# Patient Record
Sex: MALE | Race: WHITE | NOT HISPANIC OR LATINO | Employment: UNEMPLOYED | URBAN - METROPOLITAN AREA
[De-identification: names, ages, dates, MRNs, and addresses within clinical notes are randomized per-mention and may not be internally consistent; named-entity substitution may affect disease eponyms.]

---

## 2018-02-01 ENCOUNTER — OFFICE VISIT CONVERTED (OUTPATIENT)
Dept: SURGERY | Facility: CLINIC | Age: 23
End: 2018-02-01
Attending: PHYSICIAN ASSISTANT

## 2018-03-12 ENCOUNTER — CONVERSION ENCOUNTER (OUTPATIENT)
Dept: UROLOGY | Facility: CLINIC | Age: 23
End: 2018-03-12

## 2018-03-12 ENCOUNTER — OFFICE VISIT CONVERTED (OUTPATIENT)
Dept: UROLOGY | Facility: CLINIC | Age: 23
End: 2018-03-12
Attending: UROLOGY

## 2020-04-13 ENCOUNTER — OFFICE VISIT CONVERTED (OUTPATIENT)
Dept: FAMILY MEDICINE CLINIC | Facility: CLINIC | Age: 25
End: 2020-04-13
Attending: PHYSICIAN ASSISTANT

## 2020-04-17 ENCOUNTER — HOSPITAL ENCOUNTER (OUTPATIENT)
Dept: GENERAL RADIOLOGY | Facility: HOSPITAL | Age: 25
Discharge: HOME | End: 2020-04-17
Attending: PHYSICIAN ASSISTANT

## 2020-06-22 ENCOUNTER — OFFICE VISIT CONVERTED (OUTPATIENT)
Dept: FAMILY MEDICINE CLINIC | Facility: CLINIC | Age: 25
End: 2020-06-22
Attending: PHYSICIAN ASSISTANT

## 2020-06-23 ENCOUNTER — HOSPITAL ENCOUNTER (OUTPATIENT)
Dept: LAB | Facility: HOSPITAL | Age: 25
Discharge: HOME | End: 2020-06-23
Attending: PHYSICIAN ASSISTANT

## 2020-06-23 LAB
25(OH)D3 SERPL-MCNC: 18.1 NG/ML (ref 30–100)
ALBUMIN SERPL-MCNC: 4.8 G/DL (ref 3.5–5)
ALBUMIN/GLOB SERPL: 1.6 {RATIO} (ref 1.4–2.6)
ALP SERPL-CCNC: 100 U/L (ref 53–128)
ALT SERPL-CCNC: 17 U/L (ref 10–40)
AMYLASE SERPL-CCNC: 48 U/L (ref 30–110)
ANION GAP SERPL CALC-SCNC: 23 MMOL/L (ref 8–19)
APPEARANCE UR: CLEAR
AST SERPL-CCNC: 20 U/L (ref 15–50)
BASOPHILS # BLD AUTO: 0.03 10*3/UL (ref 0–0.2)
BASOPHILS NFR BLD AUTO: 0.5 % (ref 0–3)
BILIRUB SERPL-MCNC: 0.62 MG/DL (ref 0.2–1.3)
BILIRUB UR QL: NEGATIVE
BUN SERPL-MCNC: 10 MG/DL (ref 5–25)
BUN/CREAT SERPL: 9 {RATIO} (ref 6–20)
CALCIUM SERPL-MCNC: 9.6 MG/DL (ref 8.7–10.4)
CHLORIDE SERPL-SCNC: 102 MMOL/L (ref 99–111)
CHOLEST SERPL-MCNC: 165 MG/DL (ref 107–200)
CHOLEST/HDLC SERPL: 4.3 {RATIO} (ref 3–6)
COLOR UR: YELLOW
CONV ABS IMM GRAN: 0.01 10*3/UL (ref 0–0.2)
CONV CO2: 20 MMOL/L (ref 22–32)
CONV COLLECTION SOURCE (UA): NORMAL
CONV IMMATURE GRAN: 0.2 % (ref 0–1.8)
CONV TOTAL PROTEIN: 7.8 G/DL (ref 6.3–8.2)
CONV UROBILINOGEN IN URINE BY AUTOMATED TEST STRIP: 0.2 {EHRLICHU}/DL (ref 0.1–1)
CREAT UR-MCNC: 1.08 MG/DL (ref 0.7–1.2)
DEPRECATED RDW RBC AUTO: 39.8 FL (ref 35.1–43.9)
EOSINOPHIL # BLD AUTO: 0.12 10*3/UL (ref 0–0.7)
EOSINOPHIL # BLD AUTO: 2.1 % (ref 0–7)
ERYTHROCYTE [DISTWIDTH] IN BLOOD BY AUTOMATED COUNT: 12.2 % (ref 11.6–14.4)
GFR SERPLBLD BASED ON 1.73 SQ M-ARVRAT: >60 ML/MIN/{1.73_M2}
GLOBULIN UR ELPH-MCNC: 3 G/DL (ref 2–3.5)
GLUCOSE SERPL-MCNC: 94 MG/DL (ref 70–99)
GLUCOSE UR QL: NEGATIVE MG/DL
HCT VFR BLD AUTO: 44.1 % (ref 42–52)
HDLC SERPL-MCNC: 38 MG/DL (ref 40–60)
HGB BLD-MCNC: 14.2 G/DL (ref 14–18)
HGB UR QL STRIP: NEGATIVE
KETONES UR QL STRIP: NEGATIVE MG/DL
LDLC SERPL CALC-MCNC: 109 MG/DL (ref 70–100)
LEUKOCYTE ESTERASE UR QL STRIP: NEGATIVE
LIPASE SERPL-CCNC: 44 U/L (ref 5–51)
LYMPHOCYTES # BLD AUTO: 1.92 10*3/UL (ref 1–5)
LYMPHOCYTES NFR BLD AUTO: 33.3 % (ref 20–45)
MCH RBC QN AUTO: 28.9 PG (ref 27–31)
MCHC RBC AUTO-ENTMCNC: 32.2 G/DL (ref 33–37)
MCV RBC AUTO: 89.6 FL (ref 80–96)
MONOCYTES # BLD AUTO: 0.46 10*3/UL (ref 0.2–1.2)
MONOCYTES NFR BLD AUTO: 8 % (ref 3–10)
NEUTROPHILS # BLD AUTO: 3.23 10*3/UL (ref 2–8)
NEUTROPHILS NFR BLD AUTO: 55.9 % (ref 30–85)
NITRITE UR QL STRIP: NEGATIVE
NRBC CBCN: 0 % (ref 0–0.7)
OSMOLALITY SERPL CALC.SUM OF ELEC: 291 MOSM/KG (ref 273–304)
PH UR STRIP.AUTO: 5 [PH] (ref 5–8)
PLATELET # BLD AUTO: 210 10*3/UL (ref 130–400)
PMV BLD AUTO: 12.3 FL (ref 9.4–12.4)
POTASSIUM SERPL-SCNC: 3.9 MMOL/L (ref 3.5–5.3)
PROT UR QL: NEGATIVE MG/DL
RBC # BLD AUTO: 4.92 10*6/UL (ref 4.7–6.1)
SODIUM SERPL-SCNC: 141 MMOL/L (ref 135–147)
SP GR UR: 1.02 (ref 1–1.03)
T4 FREE SERPL-MCNC: 1.6 NG/DL (ref 0.9–1.8)
TRIGL SERPL-MCNC: 91 MG/DL (ref 40–150)
TSH SERPL-ACNC: 2.82 M[IU]/L (ref 0.27–4.2)
VLDLC SERPL-MCNC: 18 MG/DL (ref 5–37)
WBC # BLD AUTO: 5.77 10*3/UL (ref 4.8–10.8)

## 2020-06-25 ENCOUNTER — HOSPITAL ENCOUNTER (OUTPATIENT)
Dept: GENERAL RADIOLOGY | Facility: HOSPITAL | Age: 25
Discharge: HOME | End: 2020-06-25
Attending: PHYSICIAN ASSISTANT

## 2020-06-29 ENCOUNTER — HOSPITAL ENCOUNTER (OUTPATIENT)
Dept: GENERAL RADIOLOGY | Facility: HOSPITAL | Age: 25
Discharge: HOME | End: 2020-06-29
Attending: PHYSICIAN ASSISTANT

## 2020-11-06 ENCOUNTER — OFFICE VISIT CONVERTED (OUTPATIENT)
Dept: FAMILY MEDICINE CLINIC | Facility: CLINIC | Age: 25
End: 2020-11-06
Attending: PHYSICIAN ASSISTANT

## 2020-11-10 ENCOUNTER — HOSPITAL ENCOUNTER (OUTPATIENT)
Dept: LAB | Facility: HOSPITAL | Age: 25
Discharge: HOME | End: 2020-11-10
Attending: PHYSICIAN ASSISTANT

## 2020-11-11 LAB
BARLEY IGE QN: <0.1
BEEF IGE QN: <0.1 K[IU]/ML (ref 0–0.35)
CHICKEN DROP IGE QN: <0.1
COCOA IGE QN: <0.1 K[IU]/ML (ref 0–0.35)
CORN IGE QN: <0.1 K[IU]/ML (ref 0–0.35)
COW MILK IGE QN: 0.48 K[IU]/ML (ref 0–0.35)
EGG WHITE IGE QN: <0.1 K[IU]/ML (ref 0–0.35)
IGE BREWER'S YEAST: <0.1 K[IU]/ML (ref 0–0.35)
IGE SERPL-ACNC: 55 K[IU]/ML (ref 0–24)
IMMUNOCAP RESULT: ABNORMAL (ref 0–0)
LETTUCE IGE QN: <0.1 K[IU]/ML
MALT IGE QN: <0.1
OAT IGE QN: <0.1 K[IU]/ML (ref 0–0.35)
ORANGE IGE QN: <0.1
PEANUT IGE QN: <0.1 K[IU]/ML (ref 0–0.35)
PORK IGE: <0.1 K[IU]/ML (ref 0–0.35)
POTATO IGE QN: <0.1 K[IU]/ML (ref 0–0.35)
RYE IGE: <0.1
SOYBEAN IGE QN: <0.1 K[IU]/ML (ref 0–0.35)
TOMATO IGE QN: <0.1
WHEAT IGE QN: 0.13 K[IU]/ML (ref 0–0.35)

## 2020-11-16 ENCOUNTER — OFFICE VISIT CONVERTED (OUTPATIENT)
Dept: PODIATRY | Facility: CLINIC | Age: 25
End: 2020-11-16
Attending: PODIATRIST

## 2020-11-30 ENCOUNTER — OFFICE VISIT CONVERTED (OUTPATIENT)
Dept: PODIATRY | Facility: CLINIC | Age: 25
End: 2020-11-30
Attending: PODIATRIST

## 2020-12-07 ENCOUNTER — HOSPITAL ENCOUNTER (OUTPATIENT)
Dept: GENERAL RADIOLOGY | Facility: HOSPITAL | Age: 25
Discharge: HOME | End: 2020-12-07
Attending: PHYSICIAN ASSISTANT

## 2020-12-10 ENCOUNTER — HOSPITAL ENCOUNTER (OUTPATIENT)
Dept: GENERAL RADIOLOGY | Facility: HOSPITAL | Age: 25
Discharge: HOME | End: 2020-12-10
Attending: PHYSICIAN ASSISTANT

## 2021-03-08 ENCOUNTER — OFFICE VISIT CONVERTED (OUTPATIENT)
Dept: FAMILY MEDICINE CLINIC | Facility: CLINIC | Age: 26
End: 2021-03-08
Attending: PHYSICIAN ASSISTANT

## 2021-03-11 ENCOUNTER — HOSPITAL ENCOUNTER (OUTPATIENT)
Dept: LAB | Facility: HOSPITAL | Age: 26
Discharge: HOME | End: 2021-03-11
Attending: PHYSICIAN ASSISTANT

## 2021-03-11 LAB
ALBUMIN SERPL-MCNC: 4.7 G/DL (ref 3.5–5)
ALBUMIN/GLOB SERPL: 1.5 {RATIO} (ref 1.4–2.6)
ALP SERPL-CCNC: 115 U/L (ref 53–128)
ALT SERPL-CCNC: 33 U/L (ref 10–40)
AMYLASE SERPL-CCNC: 53 U/L (ref 30–110)
ANION GAP SERPL CALC-SCNC: 15 MMOL/L (ref 8–19)
AST SERPL-CCNC: 31 U/L (ref 15–50)
BASOPHILS # BLD AUTO: 0.03 10*3/UL (ref 0–0.2)
BASOPHILS NFR BLD AUTO: 0.6 % (ref 0–3)
BILIRUB SERPL-MCNC: 0.44 MG/DL (ref 0.2–1.3)
BUN SERPL-MCNC: 10 MG/DL (ref 5–25)
BUN/CREAT SERPL: 10 {RATIO} (ref 6–20)
CALCIUM SERPL-MCNC: 9.2 MG/DL (ref 8.7–10.4)
CHLORIDE SERPL-SCNC: 102 MMOL/L (ref 99–111)
CONV ABS IMM GRAN: 0.02 10*3/UL (ref 0–0.2)
CONV CO2: 24 MMOL/L (ref 22–32)
CONV IMMATURE GRAN: 0.4 % (ref 0–1.8)
CONV TOTAL PROTEIN: 7.8 G/DL (ref 6.3–8.2)
CREAT UR-MCNC: 0.97 MG/DL (ref 0.7–1.2)
DEPRECATED RDW RBC AUTO: 38.9 FL (ref 35.1–43.9)
EOSINOPHIL # BLD AUTO: 0.09 10*3/UL (ref 0–0.7)
EOSINOPHIL # BLD AUTO: 1.8 % (ref 0–7)
ERYTHROCYTE [DISTWIDTH] IN BLOOD BY AUTOMATED COUNT: 12.2 % (ref 11.6–14.4)
GFR SERPLBLD BASED ON 1.73 SQ M-ARVRAT: >60 ML/MIN/{1.73_M2}
GLOBULIN UR ELPH-MCNC: 3.1 G/DL (ref 2–3.5)
GLUCOSE SERPL-MCNC: 89 MG/DL (ref 70–99)
HCT VFR BLD AUTO: 42.9 % (ref 42–52)
HGB BLD-MCNC: 14.4 G/DL (ref 14–18)
LIPASE SERPL-CCNC: 50 U/L (ref 5–51)
LYMPHOCYTES # BLD AUTO: 1.71 10*3/UL (ref 1–5)
LYMPHOCYTES NFR BLD AUTO: 34 % (ref 20–45)
MCH RBC QN AUTO: 29.1 PG (ref 27–31)
MCHC RBC AUTO-ENTMCNC: 33.6 G/DL (ref 33–37)
MCV RBC AUTO: 86.8 FL (ref 80–96)
MONOCYTES # BLD AUTO: 0.37 10*3/UL (ref 0.2–1.2)
MONOCYTES NFR BLD AUTO: 7.4 % (ref 3–10)
NEUTROPHILS # BLD AUTO: 2.81 10*3/UL (ref 2–8)
NEUTROPHILS NFR BLD AUTO: 55.8 % (ref 30–85)
NRBC CBCN: 0 % (ref 0–0.7)
OSMOLALITY SERPL CALC.SUM OF ELEC: 283 MOSM/KG (ref 273–304)
PLATELET # BLD AUTO: 177 10*3/UL (ref 130–400)
PMV BLD AUTO: 12 FL (ref 9.4–12.4)
POTASSIUM SERPL-SCNC: 4.1 MMOL/L (ref 3.5–5.3)
RBC # BLD AUTO: 4.94 10*6/UL (ref 4.7–6.1)
SODIUM SERPL-SCNC: 137 MMOL/L (ref 135–147)
WBC # BLD AUTO: 5.03 10*3/UL (ref 4.8–10.8)

## 2021-03-12 ENCOUNTER — HOSPITAL ENCOUNTER (OUTPATIENT)
Dept: LAB | Facility: HOSPITAL | Age: 26
Discharge: HOME | End: 2021-03-12
Attending: PHYSICIAN ASSISTANT

## 2021-03-12 LAB
APPEARANCE UR: CLEAR
BILIRUB UR QL: NEGATIVE
COLOR UR: YELLOW
CONV COLLECTION SOURCE (UA): NORMAL
CONV HEPATITIS B SURFACE AG W CONFIRMATION RE: NEGATIVE
CONV UROBILINOGEN IN URINE BY AUTOMATED TEST STRIP: 1 {EHRLICHU}/DL (ref 0.1–1)
GLUCOSE UR QL: NEGATIVE MG/DL
H PYLORI IGM SER-ACNC: <9 UNITS (ref 0–8.9)
HAV IGM SERPL QL IA: NEGATIVE
HBV CORE IGM SERPL QL IA: NEGATIVE
HCV AB SER DONR QL: <0.1 S/CO RATIO (ref 0–0.9)
HGB UR QL STRIP: NEGATIVE
KETONES UR QL STRIP: NEGATIVE MG/DL
LEUKOCYTE ESTERASE UR QL STRIP: NEGATIVE
NITRITE UR QL STRIP: NEGATIVE
PH UR STRIP.AUTO: 7.5 [PH] (ref 5–8)
PROT UR QL: NEGATIVE MG/DL
SP GR UR: 1.01 (ref 1–1.03)

## 2021-03-14 LAB — BACTERIA SPEC AEROBE CULT: NORMAL

## 2021-03-22 ENCOUNTER — HOSPITAL ENCOUNTER (OUTPATIENT)
Dept: ULTRASOUND IMAGING | Facility: HOSPITAL | Age: 26
Discharge: HOME | End: 2021-03-22
Attending: PHYSICIAN ASSISTANT

## 2021-05-10 NOTE — H&P
"   History and Physical      Patient Name: Chris Gomez   Patient ID: [id number]   Sex: Male   YOB: 1995    Primary Care Provider: Gerson Woodson PA-C   Referring Provider: Gerson Woodson PA-C    Visit Date: November 16, 2020    Provider: Josue Weinberg DPM   Location: [facility]   Location Address: [address] [place] [address]  [id number]   Location Phone: [phone]          Chief Complaint  · Right Foot Pain  · Left Foot Pain  · Ingrown Toenail      History Of Present Illness  Chris Gomez presents to the office today for evaluation and treatment of      1 year  Onset:  Gradual  Nature:  sore, sharp  Stable, worsening, improving:  worsening    Aggravating factors:  Patient relates pain is aggravated by shoe gear and ambulation.   Previous Treatment:  Self debridement    Patient denies any fevers, chills, nausea, vomiting, shortness of breathe, nor any other constitutional signs nor symptoms.        \">New, Established, New Problem:  New  Location:  b/l medial and lateral 1st toenail border(s)  Duration:  > 1 year  Onset:  Gradual  Nature:  sore, sharp  Stable, worsening, improving:  worsening    Aggravating factors:  Patient relates pain is aggravated by shoe gear and ambulation.   Previous Treatment:  Self debridement    Patient denies any fevers, chills, nausea, vomiting, shortness of breathe, nor any other constitutional signs nor symptoms.               Past Medical History  Asthma         Past Surgical History  *I have had no surgeries         Allergy List  Contrast media allergy; Milk       Allergies Reconciled  Family Medical History  Lung Neoplasm, Malignant; Diabetes, unspecified type; Renal Calculus; Family history of breast cancer; Renal Cancer; FH: heart disease; FH: stroke         Social History  Alcohol (Never); Caffeine (Current - status unknown); Second hand smoke exposure (Current some day); Tobacco (Never)         Review of " "Systems  · Constitutional  o Denies  o : fatigue, night sweats  · Eyes  o Denies  o : double vision, blurred vision  · HENT  o Denies  o : vertigo, recent head injury  · Cardiovascular  o Denies  o : chest pain, irregular heart beats  · Respiratory  o Denies  o : shortness of breath, productive cough  · Gastrointestinal  o Denies  o : nausea, vomiting  · Genitourinary  o Denies  o : dysuria, urinary retention  · Integument  o * See HPI  · Neurologic  o Denies  o : altered mental status, seizures  · Musculoskeletal  o Denies  o : joint swelling, limitation of motion  · Endocrine  o Denies  o : cold intolerance, heat intolerance  · Heme-Lymph  o Denies  o : petechiae, lymph node enlargement or tenderness  · Allergic-Immunologic  o Denies  o : frequent illnesses      Vitals  Date Time BP Position Site L\R Cuff Size HR RR TEMP (F) WT  HT  BMI kg/m2 BSA m2 O2 Sat FR L/min FiO2 HC       11/16/2020 08:17 /70 Sitting    77 - R  97.1 207lbs 0oz 5'  10\" 29.7 2.15 99 %            Physical Examination  · Constitutional  o Appearance  o : The patient is awake, alert, well developed, well groomed and well nourished.   · Cardiovascular  o Peripheral Vascular System  o :   § Pedal Pulses  § : 2+ and symmetrical  § Extremities  § : No edema  · Musculoskeletal  o Extremeties/Joint  o : Lower extremity muscle strength and range of motion is equal and symmetrical bilaterally. The knees are noted to be in normal alignment. Ankle alignment and range of motion is normal and foot structure is normal. Subtalar, metatarsal and metatarsal-phalangeal joint range of motion is noted to be within normal limits. The digits of both feet are in normal alignment. The gait is normal.   · Neurologic  o Muskuloskeletal  o :   § RLE  § : Epicritic Sensation intact  § LLE  § : Epicritic Sensation intact  · Toes  o Toes: Right Foot  o :   § Toenails  § : Ingrowing Toenail 1st Toe, lateral/medial  o Toes: Left Foot  o :   § Toenails  § : Ingrowing " Toenail at 1st Toe, lateral/medial  · Procedures  o Ingrown Toenail  o : P&A-This procedure is indicated for onychocryptosis. Indications, risks and benefits and alternative treatments have been discussed with this patient who has agreed to this procedure. The area was sterilely prepped with a povidone-iodine solution. The affected area was locally anesthetized with 3cc, of lidocaine 1%. The offending nail plate was completely excised. A sterile dressing was applied. The patient tolerated the procedure well.           Assessment  · Foot pain, left     729.5/M79.672  · Foot pain, right     729.5/M79.671  · Ingrowing nail     703.0/L60.0  · Onychia and paronychia of toe     681.11/L03.039      Plan  · Orders  o 2 - Excision of nail and nail matrix (22598) - - 11/16/2020  · Medications  o Medications have been Reconciled  o Transition of Care or Provider Policy  · Instructions  o Follow-up in 2 weeks; P & A f/u  o Post operative instructions have been given to the patient for daily care.   o I have discussed the findings of this evaluation with the patient. The discussion included a complete verbal explanation of any changes in the examination results, diagnosis, and the current treatment plan. A schedule for future care needs was explained. If any questions should arise after returning home, I have encouraged the patient to feel free to contact Dr. Weinberg. The patient states understanding and agreement with this plan.   o Pt to monitor for problems and to contact Dr. Weinberg for follow-up should such signs occur. Patient states understanding and agreement with this plan.   o Electronically Identified Patient Education Materials Provided Electronically  · Disposition  o Call or Return if symptoms worsen or persist.            Electronically Signed by: Josue Weinberg DPM -Author on November 16, 2020 08:53:41 AM

## 2021-05-12 NOTE — PROGRESS NOTES
Progress Note      Patient Name: Chris Gomez   Patient ID: [id number]   Sex: Male   YOB: 1995    Primary Care Provider: Gerson Woodson PA-C    Visit Date: April 13, 2020    Provider: Gerson Woodson PA-C   Location: [facility]   Location Address: [address] [place] [address], [address]  [place], [place]  [id number]   Location Phone: [phone]          Chief Complaint  · New patient/establish care      History Of Present Illness  Chris Gomez is a 25 year old /White male who presents for evaluation and treatment of: New patient/establish care.   Video Conferencing Visit  Chris Gomez is a 25 year old /White male who is presenting for evaluation via video conferencing. Verbal consent obtained before beginning visit.   The following staff were present during this visit: Naa Rodriguez CMA/Gerson Woodson PA-C      Allergy pill- Benadryl for the past 7 days.  Sinus congestion drainage well controlled.  Sneezing.  No fever, chills, cough.    Asthma - as a child - but not using any inhalers and hasn't since graduation from   Allergy injections - middle school - Dr. Manuel Patino    [facility] of ADD/ADHD - first grade - he was on med at one point but not anymore.      No past surgical hx    IV contrast - causes fast heart rate - felt like he was going to pass out.    Pt had labs 3 years ago - everything was normal then per pt    Bottom left testicular pain, feels larger in one area.   When he lays down it goes away.  He noticed this about 1 month ago.  No pain, just there.  This only happens while he eats oatmeal.       Past Medical History  Disease Name Date Onset Notes   Asthma --  --          Past Surgical History  Procedure Name Date Notes   *I have had no surgeries --  --          Medication List  Name Date Started Instructions   Benadryl 25 mg oral capsule  take 1 capsule (25 mg) by oral route 3 times per day         Allergy List  Allergen Name Date Reaction Notes   Contrast media allergy --  --  --           Family Medical History  Disease Name Relative/Age Notes   Lung Neoplasm, Malignant Father/   --    Diabetes, unspecified type Grandfather (maternal)/  Grandmother (maternal)/   Grandmother (maternal); Grandfather (maternal); Uncle (maternal)   Renal Calculus  Aunt (paternal)   Family history of breast cancer  Aunt/70s   Renal cancer  Aunt         Social History  Finding Status Start/Stop Quantity Notes   Alcohol Never --/-- --  drinks no   Caffeine Current - status unknown --/-- --  drinks soft drinks; 1-2 times per day   Second hand smoke exposure Current some day --/-- --  yes   Tobacco Never --/-- --  --          Review of Systems  · Constitutional  o Denies  o : fever, fatigue, weight loss, weight gain  · Cardiovascular  o Denies  o : lower extremity edema, claudication, chest pressure, palpitations  · Respiratory  o Denies  o : shortness of breath, wheezing, cough, hemoptysis, dyspnea on exertion  · Gastrointestinal  o Denies  o : nausea, vomiting, diarrhea, constipation, abdominal pain      Physical Examination  · Constitutional  o Appearance  o : well-nourished, no acute distress  · Head and Face  o Head  o :   § Inspection  § : atraumatic, normocephalic  · Respiratory  o Respiratory Effort  o : breathing comfortably, no cough  · Skin and Subcutaneous Tissue  o General Inspection  o : no visible rash, no lesions  · Neurologic  o Mental Status Examination  o :   § Orientation  § : grossly oriented to person, place and time, no facial droop          Assessment  · Allergic rhinitis due to allergen     477.9/J30.9  · Left testicular pain     608.9/N50.812    Problems Reconciled  Plan  · Orders  o Physical, Primary Care Panel (CBC, CMP, Lipid, TSH) [facility] (44624, 19341, 66512, 57717) - - 04/13/2020  o Urinalysis with Reflex Microscopy if abnormal ([facility]) (62166) - - 04/13/2020  o Vitamin D (25-Hydroxy) Level (91528) - - 04/13/2020  o ACO-39: Current medications updated and reviewed () - -  04/13/2020  o [facility] 01577) - 608.9/N50.812 - 04/13/2020  · Medications  o Medications have been Reconciled  o Transition of Care or Provider Policy  · Instructions  o Take all medications as prescribed/directed.  o Patient instructed/educated on their diet and exercise program.  o Patient was educated/instructed on their diagnosis, treatment and medications prior to discharge from the clinic today.  o Patient counseled to reduce calorie intake.  o Patient was instructed to exercise regularly.  o Discussed Covid-19 precautions including, but not limited to, social distancing, avoid touching your face, and hand washing.   · Disposition  o Call or Return if symptoms worsen or persist.  o F/U in 6 months  o Care Transition            Electronically Signed by: Gerson Woodson PA-C -Author on April 13, 2020 09:09:49 AM

## 2021-05-13 NOTE — PROGRESS NOTES
"   Progress Note      Patient Name: Chris Gomez   Patient ID: [id number]   Sex: Male   YOB: 1995    Primary Care Provider: Gerson Woodson PA-C   Referring Provider: Gerson Woodson PA-C    Visit Date: November 30, 2020    Provider: Josue Weinberg DPM   Location: [facility]   Location Address: [address] [place] [address]  [id number]   Location Phone: [phone]          Chief Complaint  · Right Foot Pain  · Left Foot Pain  · Ingrown Toenail      History Of Present Illness  Chris Gomez presents to the office today for evaluation and treatment of      1 year  Onset:  Gradual  Nature:  sore, sharp  Stable, worsening, improving:  improving  Aggravating factors:  Patient relates pain is aggravated by shoe gear and ambulation.   Previous Treatment:  Self debridement, P & A    Patient denies any fevers, chills, nausea, vomiting, shortness of breathe, nor any other constitutional signs nor symptoms.      Patient relates no medical changes since their last visit.\">New, Established, New Problem:  est  Location:  b/l medial and lateral 1st toenail border(s)  Duration:  > 1 year  Onset:  Gradual  Nature:  sore, sharp  Stable, worsening, improving:  improving  Aggravating factors:  Patient relates pain is aggravated by shoe gear and ambulation.   Previous Treatment:  Self debridement, P & A    Patient denies any fevers, chills, nausea, vomiting, shortness of breathe, nor any other constitutional signs nor symptoms.      Patient relates no medical changes since their last visit.       Past Medical History  Asthma         Past Surgical History  *I have had no surgeries         Allergy List  Contrast media allergy; Milk       Allergies Reconciled  Family Medical History  Lung Neoplasm, Malignant; Diabetes, unspecified type; Renal Calculus; Family history of breast cancer; Renal Cancer; FH: heart disease; FH: stroke         Social History  Alcohol (Never); Caffeine (Current - status unknown); " "Second hand smoke exposure (Current some day); Tobacco (Never)         Review of Systems  · Constitutional  o Denies  o : fatigue, night sweats  · Eyes  o Denies  o : double vision, blurred vision  · HENT  o Denies  o : vertigo, recent head injury  · Cardiovascular  o Denies  o : chest pain, irregular heart beats  · Respiratory  o Denies  o : shortness of breath, productive cough  · Gastrointestinal  o Denies  o : nausea, vomiting  · Genitourinary  o Denies  o : dysuria, urinary retention  · Integument  o * See HPI  · Neurologic  o Denies  o : altered mental status, seizures  · Musculoskeletal  o Denies  o : joint swelling, limitation of motion  · Endocrine  o Denies  o : cold intolerance, heat intolerance  · Heme-Lymph  o Denies  o : petechiae, lymph node enlargement or tenderness  · Allergic-Immunologic  o Denies  o : frequent illnesses      Vitals  Date Time BP Position Site L\R Cuff Size HR RR TEMP (F) WT  HT  BMI kg/m2 BSA m2 O2 Sat FR L/min FiO2 HC       11/30/2020 08:52 /80 Sitting    88 - R  97.1 205lbs 16oz 5'  10\" 29.56 2.15 97 %            Physical Examination  · Constitutional  o Appearance  o : The patient is awake, alert, well developed, well groomed and well nourished.   · Cardiovascular  o Peripheral Vascular System  o :   § Pedal Pulses  § : 2+ and symmetrical  § Extremities  § : No edema  · Musculoskeletal  o Extremeties/Joint  o : Lower extremity muscle strength and range of motion is equal and symmetrical bilaterally. The knees are noted to be in normal alignment. Ankle alignment and range of motion is normal and foot structure is normal. Subtalar, metatarsal and metatarsal-phalangeal joint range of motion is noted to be within normal limits. The digits of both feet are in normal alignment. The gait is normal.   · Neurologic  o Muskuloskeletal  o :   § RLE  § : Epicritic Sensation intact  § LLE  § : Epicritic Sensation intact  · Toes  o Toes: Right Foot  o :   § Toenails  § : Toenail 1st " Toe, lateral/medial; healing without complications  o Toes: Left Foot  o :   § Toenails  § : Toenail 1st Toe, lateral/medial; healing without complications          Assessment  · Foot pain, left     729.5/M79.672  · Foot pain, right     729.5/M79.671  · Ingrowing nail     703.0/L60.0  · Onychia and paronychia of toe     681.11/L03.039      Plan  · Medications  o Medications have been Reconciled  o Transition of Care or Provider Policy  · Instructions  o Post operative instructions have been given to the patient for daily care.   o I have discussed the findings of this evaluation with the patient. The discussion included a complete verbal explanation of any changes in the examination results, diagnosis, and the current treatment plan. A schedule for future care needs was explained. If any questions should arise after returning home, I have encouraged the patient to feel free to contact Dr. Weinberg. The patient states understanding and agreement with this plan.   o Pt to monitor for problems and to contact Dr. Weinberg for follow-up should such signs occur. Patient states understanding and agreement with this plan.   o Electronically Identified Patient Education Materials Provided Electronically  · Disposition  o Call or Return if symptoms worsen or persist.            Electronically Signed by: Josue Weinberg DPM -Author on November 30, 2020 08:56:09 AM

## 2021-05-13 NOTE — PROGRESS NOTES
Progress Note      Patient Name: Chris Gomez   Patient ID: [id number]   Sex: Male   YOB: 1995    Primary Care Provider: Gerson Woodson PA-C   Referring Provider: Gerson Woodson PA-C    Visit Date: November 6, 2020    Provider: Gerson Woodson PA-C   Location: [facility]   Location Address: [address] [place] [address], [address]  [place], [place]  [id number]   Location Phone: [phone]          Chief Complaint  · Routine follow up  · left eye weaker thatn the rt  · headaches      History Of Present Illness  Chris Gomez is a 25 year old /White male who presents for evaluation and treatment of: routine follow up.      pt presents today for routine follow up.    pt states his left eye vision feels weaker thatn the right.    pt states he needs his greater toe toenails possible ingrown nails.  Months this has been bothering him.  He cuts them himself for years.    pt states his left testicle hurts when he eats oatmeal.  He has seen [facility] in the past.  He states that he does not have the pain, since he does not eat oatmeal.    pt states at times his lower back has a burning feeling when he drinks alot of water.    Labs 6/20    He has some nausea with certain foods and HAs.    Left eye vision change           Past Medical History  Disease Name Date Onset Notes   Asthma --  --          Past Surgical History  Procedure Name Date Notes   *I have had no surgeries --  --          Medication List  Name Date Started Instructions   Benadryl 25 mg oral capsule  take 1 capsule (25 mg) by oral route 3 times per day         Allergy List  Allergen Name Date Reaction Notes   Contrast media allergy --  --  --          Family Medical History  Disease Name Relative/Age Notes   Lung Neoplasm, Malignant Father/   --    Diabetes, unspecified type Grandfather (maternal)/  Grandmother (maternal)/   Grandmother (maternal); Grandfather (maternal); Uncle (maternal)   Renal Calculus  Aunt (paternal)   Family  "history of breast cancer  Aunt/70s   Renal Cancer  Aunt         Social History  Finding Status Start/Stop Quantity Notes   Alcohol Never --/-- --  drinks no   Caffeine Current - status unknown --/-- --  drinks soft drinks; 1-2 times per day   Second hand smoke exposure Current some day --/-- --  yes   Tobacco Never --/-- --  --          Review of Systems  · Constitutional  o Denies  o : fever, fatigue, weight loss, weight gain  · Cardiovascular  o Denies  o : lower extremity edema, claudication, chest pressure, palpitations  · Respiratory  o Denies  o : shortness of breath, wheezing, cough, hemoptysis, dyspnea on exertion  · Gastrointestinal  o Admits  o : nausea  o Denies  o : vomiting, diarrhea, constipation, abdominal pain      Vitals  Date Time BP Position Site L\R Cuff Size HR RR TEMP (F) WT  HT  BMI kg/m2 BSA m2 O2 Sat FR L/min FiO2 HC       11/06/2020 03:28 /71 Sitting    81 - R   208lbs 6oz 5'  10\" 29.9 2.16 100 %            Physical Examination  · Constitutional  o Appearance  o : overweight, well developed  · Head and Face  o Head  o : normocephalic, atraumatic  · Neck  o Inspection/Palpation  o : normal appearance, no masses or tenderness, trachea midline  o Thyroid  o : gland size normal, nontender, no nodules or masses present on palpation  · Respiratory  o Respiratory Effort  o : breathing unlabored  o Inspection of Chest  o : chest rise symmetric bilaterally  o Auscultation of Lungs  o : clear to auscultation bilaterally throughout inspiration and expiration  · Cardiovascular  o Heart  o :   § Auscultation of Heart  § : regular rate and rhythm, no murmurs, gallops or rubs  o Peripheral Vascular System  o :   § Extremities  § : no edema  · Lymphatic  o Neck  o : no cervical lymphadenopathy, no supraclavicular lymphadenopathy  · Psychiatric  o Mood and Affect  o : mood normal, affect appropriate     Toenail - great - bilat ingrown tailnails with erythema and drainage "           Assessment  · Asthma     493.90/J45.909  · Headache     784.0/R51  · Obesity     278.00/E66.9  · Screening for depression     V79.0/Z13.89  · Need for influenza vaccination     V04.81/Z23  · Left Visual acuity reduced     369.9/H54.7  · Ingrown toenail of both feet     703.0/L60.0      Plan  · Orders  o ACO-18: Negative screen for clinical depression using a standardized tool () - V79.0/Z13.89 - 11/06/2020  o ACO-14: Influenza immunization was not administered for reasons documented () - V04.81/Z23 - 11/06/2020   refused  o ACO-39: Current medications updated and reviewed (1159F, ) - - 11/06/2020  o OPHTHALMOLOGY CONSULTATION (OPHTH) - 369.9/H54.7 - 11/06/2020  o PODIATRY CONSULTATION (PODIA) - 703.0/L60.0 - 11/06/2020  o Food Profile 19 Immunocap [facility] (97565) - - 11/06/2020  o CT Head/Brain without IV Contrast [facility] (53548) - - 11/06/2020  · Medications  o Medications have been Reconciled  o Transition of Care or Provider Policy  · Instructions  o Depression Screen completed and scanned into the EMR under the designated folder within the patient's documents.  o Today's PHQ-9 result is _2__  o Flu vaccine declined.  o Take all medications as prescribed/directed.  o Patient instructed/educated on their diet and exercise program.  o Patient was educated/instructed on their diagnosis, treatment and medications prior to discharge from the clinic today.  o Patient counseled to reduce calorie intake.  o Patient was instructed to exercise regularly.  o Flu vaccine declined.  o Discussed Covid-19 precautions including, but not limited to, social distancing, avoid touching your face, and hand washing.   · Disposition  o Call or Return if symptoms worsen or persist.  o F/U in 4-6 months  o Care Transition            Electronically Signed by: Gerson Woodson PA-C -Author on November 8, 2020 08:13:24 PM
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
## 2021-05-14 VITALS
OXYGEN SATURATION: 97 % | TEMPERATURE: 97.1 F | SYSTOLIC BLOOD PRESSURE: 110 MMHG | HEIGHT: 70 IN | BODY MASS INDEX: 29.49 KG/M2 | HEART RATE: 88 BPM | WEIGHT: 206 LBS | DIASTOLIC BLOOD PRESSURE: 80 MMHG

## 2021-05-14 VITALS
HEART RATE: 81 BPM | DIASTOLIC BLOOD PRESSURE: 71 MMHG | BODY MASS INDEX: 29.83 KG/M2 | HEIGHT: 70 IN | SYSTOLIC BLOOD PRESSURE: 113 MMHG | OXYGEN SATURATION: 100 % | WEIGHT: 208.37 LBS

## 2021-05-14 VITALS
WEIGHT: 207 LBS | HEART RATE: 77 BPM | OXYGEN SATURATION: 99 % | DIASTOLIC BLOOD PRESSURE: 70 MMHG | HEIGHT: 70 IN | SYSTOLIC BLOOD PRESSURE: 122 MMHG | TEMPERATURE: 97.1 F | BODY MASS INDEX: 29.63 KG/M2

## 2021-05-14 VITALS
DIASTOLIC BLOOD PRESSURE: 81 MMHG | SYSTOLIC BLOOD PRESSURE: 117 MMHG | WEIGHT: 221 LBS | BODY MASS INDEX: 31.64 KG/M2 | OXYGEN SATURATION: 97 % | HEART RATE: 89 BPM | HEIGHT: 70 IN
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
## 2021-05-15 VITALS
HEIGHT: 70 IN | BODY MASS INDEX: 28.63 KG/M2 | SYSTOLIC BLOOD PRESSURE: 122 MMHG | HEART RATE: 92 BPM | DIASTOLIC BLOOD PRESSURE: 62 MMHG | WEIGHT: 200 LBS | OXYGEN SATURATION: 97 %

## 2021-05-16 VITALS
SYSTOLIC BLOOD PRESSURE: 112 MMHG | WEIGHT: 202 LBS | DIASTOLIC BLOOD PRESSURE: 69 MMHG | HEIGHT: 70 IN | HEART RATE: 85 BPM | BODY MASS INDEX: 28.92 KG/M2 | TEMPERATURE: 98.8 F

## 2021-05-16 VITALS
HEIGHT: 70 IN | WEIGHT: 202 LBS | SYSTOLIC BLOOD PRESSURE: 112 MMHG | BODY MASS INDEX: 28.92 KG/M2 | DIASTOLIC BLOOD PRESSURE: 70 MMHG

## 2021-06-13 PROBLEM — J45.909 ASTHMA: Status: ACTIVE | Noted: 2021-06-13

## 2021-06-13 RX ORDER — DIPHENHYDRAMINE HCL 25 MG
25 CAPSULE ORAL AS NEEDED
COMMUNITY
End: 2023-01-06

## 2021-06-14 ENCOUNTER — OFFICE VISIT (OUTPATIENT)
Dept: FAMILY MEDICINE CLINIC | Facility: CLINIC | Age: 26
End: 2021-06-14

## 2021-06-14 VITALS
OXYGEN SATURATION: 96 % | DIASTOLIC BLOOD PRESSURE: 76 MMHG | BODY MASS INDEX: 31.67 KG/M2 | HEART RATE: 91 BPM | WEIGHT: 221.2 LBS | SYSTOLIC BLOOD PRESSURE: 123 MMHG | HEIGHT: 70 IN

## 2021-06-14 DIAGNOSIS — T78.2XXS ANAPHYLAXIS, SEQUELA: ICD-10-CM

## 2021-06-14 DIAGNOSIS — J45.909 MILD ASTHMA, UNSPECIFIED WHETHER COMPLICATED, UNSPECIFIED WHETHER PERSISTENT: Primary | ICD-10-CM

## 2021-06-14 PROCEDURE — 99213 OFFICE O/P EST LOW 20 MIN: CPT | Performed by: PHYSICIAN ASSISTANT

## 2021-06-14 RX ORDER — EPINEPHRINE 0.3 MG/.3ML
0.3 INJECTION SUBCUTANEOUS ONCE
Qty: 1 EACH | Refills: 0 | Status: SHIPPED | OUTPATIENT
Start: 2021-06-14 | End: 2021-06-14

## 2021-06-14 RX ORDER — EPINEPHRINE 0.3 MG/.3ML
INJECTION SUBCUTANEOUS
COMMUNITY
Start: 2021-03-08 | End: 2021-06-14 | Stop reason: SDUPTHER

## 2021-06-14 RX ORDER — SUCRALFATE 1 G/1
1 TABLET ORAL
COMMUNITY
Start: 2021-03-08 | End: 2022-07-29

## 2021-06-14 NOTE — PROGRESS NOTES
"Chief Complaint  Asthma    Subjective          Chris Gomez presents to [facility]  History of Present Illness  Pt presents today for 3 month follow up.    Pt states he stopped using anything plastic due giving him abd pain.    Pt has not had any Covid vaccines    Labs 3/21    Pt does not plan on getting the covid vaccine.  No CP, SOA, HA    Pt wants his toenails checked.  He has pmh of ingrown toenails  Also wants his tonsils checked.  pmh of tonsil stones    Pt does not work and not attending school.    Objective   Vital Signs:   /76 (BP Location: Left arm)   Pulse 91   Ht 177.8 cm (70\")   Wt 100 kg (221 lb 3.2 oz)   SpO2 96%   BMI 31.74 kg/m²     Physical Exam  Vitals and nursing note reviewed.   Constitutional:       Appearance: Normal appearance.   HENT:      Head: Normocephalic and atraumatic.   Cardiovascular:      Rate and Rhythm: Normal rate and regular rhythm.   Pulmonary:      Effort: Pulmonary effort is normal.      Breath sounds: Normal breath sounds.   Musculoskeletal:      Cervical back: Neck supple.   Neurological:      Mental Status: He is alert.   Psychiatric:         Mood and Affect: Mood normal.         Behavior: Behavior normal.        Result Review :                     Assessment and Plan    Diagnoses and all orders for this visit:    1. Mild asthma, unspecified whether complicated, unspecified whether persistent (Primary)  Overview:  Pt states that he grew out of his asthma.  He is not needing an inhaler.      2. Anaphylaxis, sequela  Comments:  Given new rx for epipen    Other orders  -     EPINEPHrine (EPIPEN) 0.3 MG/0.3ML solution auto-injector injection; Inject 0.3 mL under the skin into the appropriate area as directed 1 (One) Time for 1 dose. For anaphylaxis  Dispense: 1 each; Refill: 0       Follow Up   Return in about 6 months (around 12/14/2021).  Patient was given instructions and counseling regarding his condition or for health " maintenance advice. Please see specific information pulled into the AVS if appropriate.     ANDREWS Esteban

## 2021-12-16 ENCOUNTER — OFFICE VISIT (OUTPATIENT)
Dept: FAMILY MEDICINE CLINIC | Facility: CLINIC | Age: 26
End: 2021-12-16

## 2021-12-16 VITALS
HEART RATE: 83 BPM | HEIGHT: 72 IN | SYSTOLIC BLOOD PRESSURE: 123 MMHG | WEIGHT: 222 LBS | DIASTOLIC BLOOD PRESSURE: 78 MMHG | BODY MASS INDEX: 30.07 KG/M2 | OXYGEN SATURATION: 98 %

## 2021-12-16 DIAGNOSIS — E66.09 CLASS 1 OBESITY DUE TO EXCESS CALORIES WITH SERIOUS COMORBIDITY AND BODY MASS INDEX (BMI) OF 30.0 TO 30.9 IN ADULT: Chronic | ICD-10-CM

## 2021-12-16 DIAGNOSIS — R39.11 URINARY HESITANCY: Primary | ICD-10-CM

## 2021-12-16 DIAGNOSIS — T78.2XXS ANAPHYLAXIS, SEQUELA: ICD-10-CM

## 2021-12-16 DIAGNOSIS — R10.9 ABDOMINAL PAIN, UNSPECIFIED ABDOMINAL LOCATION: ICD-10-CM

## 2021-12-16 DIAGNOSIS — J45.909 MILD ASTHMA WITHOUT COMPLICATION, UNSPECIFIED WHETHER PERSISTENT: Chronic | ICD-10-CM

## 2021-12-16 PROBLEM — E66.811 CLASS 1 OBESITY DUE TO EXCESS CALORIES WITH SERIOUS COMORBIDITY AND BODY MASS INDEX (BMI) OF 30.0 TO 30.9 IN ADULT: Chronic | Status: ACTIVE | Noted: 2021-12-16

## 2021-12-16 LAB
BILIRUB BLD-MCNC: NEGATIVE MG/DL
CLARITY, POC: CLEAR
COLOR UR: YELLOW
EXPIRATION DATE: ABNORMAL
GLUCOSE UR STRIP-MCNC: NEGATIVE MG/DL
KETONES UR QL: NEGATIVE
LEUKOCYTE EST, POC: NEGATIVE
Lab: ABNORMAL
NITRITE UR-MCNC: NEGATIVE MG/ML
PH UR: 6.5 [PH] (ref 5–8)
PROT UR STRIP-MCNC: NEGATIVE MG/DL
RBC # UR STRIP: ABNORMAL /UL
SP GR UR: 1.01 (ref 1–1.03)
UROBILINOGEN UR QL: NORMAL

## 2021-12-16 PROCEDURE — 87086 URINE CULTURE/COLONY COUNT: CPT | Performed by: PHYSICIAN ASSISTANT

## 2021-12-16 PROCEDURE — 99214 OFFICE O/P EST MOD 30 MIN: CPT | Performed by: PHYSICIAN ASSISTANT

## 2021-12-16 RX ORDER — EPINEPHRINE 0.3 MG/.3ML
0.3 INJECTION SUBCUTANEOUS ONCE
Qty: 1 EACH | Refills: 0 | Status: SHIPPED | OUTPATIENT
Start: 2021-12-16 | End: 2021-12-16

## 2021-12-16 NOTE — PROGRESS NOTES
"Chief Complaint  Asthma (6 month follow up ) and Allergies    Subjective          Chris Gomez presents to [facility]  History of Present Illness  Pt presents today for 6 month follow up.    Pt states he had rt abd pain after eating julian couple days ago. Pt states the pain was gone later that night.    Pt states he believes he has prostate issues. Pt states it is hard to urinate at times. Pt states sometimes it is a weak stream and sometimes it is a strong stream.    Labs 3/11/21    Flu refused    Past Medical History:   Diagnosis Date   • Asthma       Family History   Problem Relation Age of Onset   • Lung cancer Father         MALIGNANT   • Diabetes Maternal Grandmother    • Diabetes Maternal Grandfather    • Nephrolithiasis Maternal Uncle    • Nephrolithiasis Paternal Aunt    • Breast cancer Other 70   • Kidney cancer Other    • Heart disease Other    • Stroke Other       History reviewed. No pertinent surgical history.       Current Outpatient Medications:   •  diphenhydrAMINE (Benadryl Allergy) 25 mg capsule, prn, Disp: , Rfl:   •  EPINEPHrine (EpiPen 2-Konrad) 0.3 MG/0.3ML solution auto-injector injection, Inject 0.3 mL under the skin into the appropriate area as directed 1 (One) Time for 1 dose., Disp: 1 each, Rfl: 0  •  sucralfate (CARAFATE) 1 g tablet, prn, Disp: , Rfl:     Objective      Vital Signs:     /78 (BP Location: Right arm)   Pulse 83   Ht 182.9 cm (72\")   Wt 101 kg (222 lb)   SpO2 98%   BMI 30.11 kg/m²    Estimated body mass index is 30.11 kg/m² as calculated from the following:    Height as of this encounter: 182.9 cm (72\").    Weight as of this encounter: 101 kg (222 lb).     Physical Exam  Vitals and nursing note reviewed.   Constitutional:       Appearance: Normal appearance. He is obese.   HENT:      Head: Normocephalic and atraumatic.   Cardiovascular:      Rate and Rhythm: Normal rate and regular rhythm.   Pulmonary:      Effort: Pulmonary " effort is normal.      Breath sounds: Normal breath sounds.   Abdominal:      General: Abdomen is flat. Bowel sounds are normal.      Palpations: Abdomen is soft.   Musculoskeletal:      Cervical back: Neck supple.   Neurological:      Mental Status: He is alert.   Psychiatric:         Mood and Affect: Mood normal.         Behavior: Behavior normal.        Result Review :     Common labs    Common Labsle 3/11/21 3/11/21    0814 0814   Glucose  89   BUN  10   Creatinine  0.97   Sodium  137   Potassium  4.1   Chloride  102   Calcium  9.2   Albumin  4.7   Total Bilirubin  0.44   Alkaline Phosphatase  115   AST (SGOT)  31   ALT (SGPT)  33   WBC 5.03    Hemoglobin 14.4    Hematocrit 42.9    Platelets 177                          Assessment and Plan      Diagnoses and all orders for this visit:    1. Urinary hesitancy (Primary)  -     POCT urinalysis dipstick, automated  -     CBC & Differential; Future  -     Comprehensive Metabolic Panel; Future  -     TSH Rfx On Abnormal To Free T4; Future    2. Abdominal pain, unspecified abdominal location  -     Fecal Lactoferrin - Stool, Per Rectum; Future  -     Giardia / Cryptosporidium Screen - Stool, Per Rectum; Future  -     Celiac Disease Antibody Screen; Future  -     Stool Culture (Reference Lab) - Stool, Per Rectum; Future  -     CBC & Differential; Future  -     Comprehensive Metabolic Panel; Future  -     TSH Rfx On Abnormal To Free T4; Future    3. Mild asthma without complication, unspecified whether persistent  Overview:  Pt states that he grew out of his asthma.  He is not needing an inhaler.    Orders:  -     EPINEPHrine (EpiPen 2-Konrad) 0.3 MG/0.3ML solution auto-injector injection; Inject 0.3 mL under the skin into the appropriate area as directed 1 (One) Time for 1 dose.  Dispense: 1 each; Refill: 0    4. Anaphylaxis, sequela  -     EPINEPHrine (EpiPen 2-Konrad) 0.3 MG/0.3ML solution auto-injector injection; Inject 0.3 mL under the skin into the appropriate area as  directed 1 (One) Time for 1 dose.  Dispense: 1 each; Refill: 0    5. Class 1 obesity due to excess calories with serious comorbidity and body mass index (BMI) of 30.0 to 30.9 in adult  Comments:  Disc diet and exercise  Overview:  Disc diet and exercise     Pt is concerned about parasites    Follow Up     Return in about 6 months (around 6/16/2022).    Patient was given instructions and counseling regarding his condition or for health maintenance advice. Please see specific information pulled into the AVS if appropriate.     I have reviewed information obtained and documented by others and I have confirmed the accuracy of this documented note.    ANDREWS Esteban

## 2021-12-19 LAB
BACTERIA UR CULT: NO GROWTH
BACTERIA UR CULT: NORMAL

## 2022-01-06 ENCOUNTER — TELEPHONE (OUTPATIENT)
Dept: FAMILY MEDICINE CLINIC | Facility: CLINIC | Age: 27
End: 2022-01-06

## 2022-01-17 ENCOUNTER — TELEPHONE (OUTPATIENT)
Dept: FAMILY MEDICINE CLINIC | Facility: CLINIC | Age: 27
End: 2022-01-17

## 2022-01-17 NOTE — TELEPHONE ENCOUNTER
Caller: ZAHO LARSEN    Relationship: Mother    Best call back number: [phone]      What is the best time to reach you: ANY    Who are you requesting to speak with (clinical staff, provider,  specific staff member): CLINICAL    What was the call regarding: ZHAO STATES SHE HAS QUESTIONS REGARDING PATIENTS UPCOMING LAB WORK     Do you require a callback: YES

## 2022-01-21 ENCOUNTER — LAB (OUTPATIENT)
Dept: LAB | Facility: HOSPITAL | Age: 27
End: 2022-01-21

## 2022-01-21 DIAGNOSIS — R10.9 ABDOMINAL PAIN, UNSPECIFIED ABDOMINAL LOCATION: ICD-10-CM

## 2022-01-21 DIAGNOSIS — R39.11 URINARY HESITANCY: ICD-10-CM

## 2022-01-21 LAB
ALBUMIN SERPL-MCNC: 4.2 G/DL (ref 3.5–5.2)
ALBUMIN/GLOB SERPL: 1.3 G/DL
ALP SERPL-CCNC: 116 U/L (ref 39–117)
ALT SERPL W P-5'-P-CCNC: 46 U/L (ref 1–41)
ANION GAP SERPL CALCULATED.3IONS-SCNC: 8.7 MMOL/L (ref 5–15)
AST SERPL-CCNC: 28 U/L (ref 1–40)
BASOPHILS # BLD AUTO: 0.04 10*3/MM3 (ref 0–0.2)
BASOPHILS NFR BLD AUTO: 0.6 % (ref 0–1.5)
BILIRUB SERPL-MCNC: 0.3 MG/DL (ref 0–1.2)
BUN SERPL-MCNC: 6 MG/DL (ref 6–20)
BUN/CREAT SERPL: 6.6 (ref 7–25)
CALCIUM SPEC-SCNC: 9.3 MG/DL (ref 8.6–10.5)
CHLORIDE SERPL-SCNC: 104 MMOL/L (ref 98–107)
CO2 SERPL-SCNC: 28.3 MMOL/L (ref 22–29)
CREAT SERPL-MCNC: 0.91 MG/DL (ref 0.76–1.27)
DEPRECATED RDW RBC AUTO: 40.7 FL (ref 37–54)
EOSINOPHIL # BLD AUTO: 0.18 10*3/MM3 (ref 0–0.4)
EOSINOPHIL NFR BLD AUTO: 2.9 % (ref 0.3–6.2)
ERYTHROCYTE [DISTWIDTH] IN BLOOD BY AUTOMATED COUNT: 12.3 % (ref 12.3–15.4)
GFR SERPL CREATININE-BSD FRML MDRD: 101 ML/MIN/1.73
GLOBULIN UR ELPH-MCNC: 3.3 GM/DL
GLUCOSE SERPL-MCNC: 97 MG/DL (ref 65–99)
HCT VFR BLD AUTO: 43.5 % (ref 37.5–51)
HGB BLD-MCNC: 14.6 G/DL (ref 13–17.7)
IMM GRANULOCYTES # BLD AUTO: 0.01 10*3/MM3 (ref 0–0.05)
IMM GRANULOCYTES NFR BLD AUTO: 0.2 % (ref 0–0.5)
LYMPHOCYTES # BLD AUTO: 1.99 10*3/MM3 (ref 0.7–3.1)
LYMPHOCYTES NFR BLD AUTO: 32.1 % (ref 19.6–45.3)
MCH RBC QN AUTO: 30 PG (ref 26.6–33)
MCHC RBC AUTO-ENTMCNC: 33.6 G/DL (ref 31.5–35.7)
MCV RBC AUTO: 89.5 FL (ref 79–97)
MONOCYTES # BLD AUTO: 0.49 10*3/MM3 (ref 0.1–0.9)
MONOCYTES NFR BLD AUTO: 7.9 % (ref 5–12)
NEUTROPHILS NFR BLD AUTO: 3.49 10*3/MM3 (ref 1.7–7)
NEUTROPHILS NFR BLD AUTO: 56.3 % (ref 42.7–76)
NRBC BLD AUTO-RTO: 0 /100 WBC (ref 0–0.2)
PLATELET # BLD AUTO: 188 10*3/MM3 (ref 140–450)
PMV BLD AUTO: 12 FL (ref 6–12)
POTASSIUM SERPL-SCNC: 3.9 MMOL/L (ref 3.5–5.2)
PROT SERPL-MCNC: 7.5 G/DL (ref 6–8.5)
RBC # BLD AUTO: 4.86 10*6/MM3 (ref 4.14–5.8)
SODIUM SERPL-SCNC: 141 MMOL/L (ref 136–145)
TSH SERPL DL<=0.05 MIU/L-ACNC: 2.43 UIU/ML (ref 0.27–4.2)
WBC NRBC COR # BLD: 6.2 10*3/MM3 (ref 3.4–10.8)

## 2022-01-21 PROCEDURE — 86258 DGP ANTIBODY EACH IG CLASS: CPT

## 2022-01-21 PROCEDURE — 36415 COLL VENOUS BLD VENIPUNCTURE: CPT

## 2022-01-21 PROCEDURE — 82784 ASSAY IGA/IGD/IGG/IGM EACH: CPT

## 2022-01-21 PROCEDURE — 80050 GENERAL HEALTH PANEL: CPT

## 2022-01-22 LAB
GLIADIN PEPTIDE IGA SER-ACNC: 4 UNITS (ref 0–19)
IGA SERPL-MCNC: 197 MG/DL (ref 90–386)
TTG IGA SER-ACNC: 4 U/ML (ref 0–3)

## 2022-01-24 ENCOUNTER — LAB (OUTPATIENT)
Dept: LAB | Facility: HOSPITAL | Age: 27
End: 2022-01-24

## 2022-01-24 ENCOUNTER — TELEPHONE (OUTPATIENT)
Dept: FAMILY MEDICINE CLINIC | Facility: CLINIC | Age: 27
End: 2022-01-24

## 2022-01-24 DIAGNOSIS — R10.9 ABDOMINAL PAIN, UNSPECIFIED ABDOMINAL LOCATION: ICD-10-CM

## 2022-01-24 DIAGNOSIS — R89.4 ABNORMAL CELIAC ANTIBODY PANEL: Primary | ICD-10-CM

## 2022-01-24 LAB — LACTOFERRIN STL QL LA: NEGATIVE

## 2022-01-24 PROCEDURE — 87046 STOOL CULTR AEROBIC BACT EA: CPT

## 2022-01-24 PROCEDURE — 87045 FECES CULTURE AEROBIC BACT: CPT

## 2022-01-24 PROCEDURE — 87329 GIARDIA AG IA: CPT

## 2022-01-24 PROCEDURE — 87328 CRYPTOSPORIDIUM AG IA: CPT

## 2022-01-24 PROCEDURE — 83630 LACTOFERRIN FECAL (QUAL): CPT

## 2022-01-24 PROCEDURE — 87427 SHIGA-LIKE TOXIN AG IA: CPT

## 2022-01-24 NOTE — TELEPHONE ENCOUNTER
----- Message from ANDREWS Esteban sent at 1/23/2022  2:10 PM EST -----  Weak positive Tissue Transglutaminase IGA - consult GI

## 2022-01-26 LAB
CRYPTOSP AG STL QL IA: NEGATIVE
G LAMBLIA AG STL QL IA: NEGATIVE

## 2022-01-29 LAB
BACTERIA SPEC CULT: NORMAL
BACTERIA SPEC CULT: NORMAL
CAMPYLOBACTER STL CULT: NORMAL
E COLI SXT STL QL IA: NEGATIVE
SALM + SHIG STL CULT: NORMAL

## 2022-01-31 ENCOUNTER — TELEPHONE (OUTPATIENT)
Dept: FAMILY MEDICINE CLINIC | Facility: CLINIC | Age: 27
End: 2022-01-31

## 2022-04-26 ENCOUNTER — OFFICE VISIT (OUTPATIENT)
Dept: FAMILY MEDICINE CLINIC | Facility: CLINIC | Age: 27
End: 2022-04-26

## 2022-04-26 ENCOUNTER — TELEPHONE (OUTPATIENT)
Dept: FAMILY MEDICINE CLINIC | Facility: CLINIC | Age: 27
End: 2022-04-26

## 2022-04-26 VITALS
OXYGEN SATURATION: 99 % | WEIGHT: 224 LBS | SYSTOLIC BLOOD PRESSURE: 114 MMHG | DIASTOLIC BLOOD PRESSURE: 68 MMHG | BODY MASS INDEX: 32.07 KG/M2 | HEIGHT: 70 IN | HEART RATE: 71 BPM

## 2022-04-26 DIAGNOSIS — R14.2 BELCHING: ICD-10-CM

## 2022-04-26 DIAGNOSIS — R31.9 HEMATURIA, UNSPECIFIED TYPE: Primary | ICD-10-CM

## 2022-04-26 DIAGNOSIS — E66.09 CLASS 1 OBESITY DUE TO EXCESS CALORIES WITH SERIOUS COMORBIDITY AND BODY MASS INDEX (BMI) OF 32.0 TO 32.9 IN ADULT: Chronic | ICD-10-CM

## 2022-04-26 DIAGNOSIS — T78.1XXA GASTROINTESTINAL FOOD SENSITIVITY: Primary | ICD-10-CM

## 2022-04-26 PROBLEM — E66.811 CLASS 1 OBESITY DUE TO EXCESS CALORIES WITH SERIOUS COMORBIDITY AND BODY MASS INDEX (BMI) OF 32.0 TO 32.9 IN ADULT: Status: ACTIVE | Noted: 2021-12-16

## 2022-04-26 LAB
BILIRUB BLD-MCNC: NEGATIVE MG/DL
CLARITY, POC: CLEAR
COLOR UR: YELLOW
EXPIRATION DATE: ABNORMAL
GLUCOSE UR STRIP-MCNC: NEGATIVE MG/DL
KETONES UR QL: NEGATIVE
LEUKOCYTE EST, POC: NEGATIVE
Lab: ABNORMAL
NITRITE UR-MCNC: NEGATIVE MG/ML
PH UR: 7.5 [PH] (ref 5–8)
PROT UR STRIP-MCNC: NEGATIVE MG/DL
RBC # UR STRIP: ABNORMAL /UL
SP GR UR: 1.02 (ref 1–1.03)
UROBILINOGEN UR QL: NORMAL

## 2022-04-26 PROCEDURE — 99213 OFFICE O/P EST LOW 20 MIN: CPT | Performed by: PHYSICIAN ASSISTANT

## 2022-04-26 NOTE — PROGRESS NOTES
"Chief Complaint  Allergies (Follow up)    Subjective          Chris Gomez presents to [facility]  History of Present Illness   Chris Gomez is a 27 y.o. male who presents today for a follow up- allergies    Pt states he is sensitive to milk, he noted he is trying to build a tolerance for this. Pt stated he has issues with a lot of foods- candies, sodas, broccoli     Pt stated he only takes Carafate if he has food poisoning, pt stated when he goes out to eat he will end up with it.      Pt taking Benadryl prn.     Labs 1/2022     Broccoli turned him into a walking seizure at home.  He drank a fruit soda and it laid him out for a week ( he only drank a single cap full).    Pt refuses COVID-19 vaccine.     Pt states that he has not gotten food poisoning as much this year.    Pt has gained wt since last ov.    Past Medical History:   Diagnosis Date   • Asthma       Family History   Problem Relation Age of Onset   • Lung cancer Father         MALIGNANT   • Diabetes Maternal Grandmother    • Diabetes Maternal Grandfather    • Nephrolithiasis Maternal Uncle    • Nephrolithiasis Paternal Aunt    • Breast cancer Other 70   • Kidney cancer Other    • Heart disease Other    • Stroke Other       History reviewed. No pertinent surgical history.       Current Outpatient Medications:   •  diphenhydrAMINE (BENADRYL) 25 mg capsule, prn, Disp: , Rfl:   •  sucralfate (CARAFATE) 1 g tablet, prn, Disp: , Rfl:     Objective     Vital Signs:     /68 (BP Location: Right arm)   Pulse 71   Ht 177.8 cm (70\")   Wt 102 kg (224 lb)   SpO2 99%   BMI 32.14 kg/m²    Estimated body mass index is 32.14 kg/m² as calculated from the following:    Height as of this encounter: 177.8 cm (70\").    Weight as of this encounter: 102 kg (224 lb).     Wt Readings from Last 3 Encounters:   04/26/22 102 kg (224 lb)   12/16/21 101 kg (222 lb)   06/14/21 100 kg (221 lb 3.2 oz)     BP Readings from Last 3 " Encounters:   04/26/22 114/68   12/16/21 123/78   06/14/21 123/76     Physical Exam  Vitals and nursing note reviewed.   Constitutional:       Appearance: Normal appearance. He is obese.   HENT:      Head: Normocephalic and atraumatic.   Cardiovascular:      Rate and Rhythm: Normal rate and regular rhythm.      Heart sounds: Normal heart sounds.   Pulmonary:      Effort: Pulmonary effort is normal.      Breath sounds: Normal breath sounds.   Musculoskeletal:      Cervical back: Neck supple.   Neurological:      Mental Status: He is alert.   Psychiatric:         Mood and Affect: Mood normal.         Behavior: Behavior normal.        Result Review :     Common labs    Common Labsle 1/21/22 1/21/22    0822 0822   Glucose  97   BUN  6   Creatinine  0.91   eGFR Non African Am  101   Sodium  141   Potassium  3.9   Chloride  104   Calcium  9.3   Albumin  4.20   Total Bilirubin  0.3   Alkaline Phosphatase  116   AST (SGOT)  28   ALT (SGPT)  46 (A)   WBC 6.20    Hemoglobin 14.6    Hematocrit 43.5    Platelets 188    (A) Abnormal value                     NM Hepatobiliary Without CCK (03/22/2021 15:25)  US Abdomen Limited (03/22/2021 12:19)  MRI Brain Without Contrast (12/11/2020 02:39)  CT Abdomen Pelvis Without Contrast (06/29/2020 13:08)  CT Head Without Contrast (12/07/2020 17:11)  XR Abdomen 2+ VW with Chest 1 VW (06/25/2020 08:46)    Patient Care Team:  Huang Woodson PA as PCP - General (Physician Assistant)         Assessment and Plan      Diagnoses and all orders for this visit:    1. Gastrointestinal food sensitivity (Primary)  -     Cancel: Ambulatory Referral to Gastroenterology  -     Ambulatory Referral to Gastroenterology    2. Class 1 obesity due to excess calories with serious comorbidity and body mass index (BMI) of 32.0 to 32.9 in adult  Comments:  Disc diet and exercise  Overview:  Disc diet and exercise      3. Belching    Pt is watching his diet    Follow Up     Return in about 6 months (around  10/26/2022).    Patient was given instructions and counseling regarding his condition or for health maintenance advice. Please see specific information pulled into the AVS if appropriate.     I have reviewed information obtained and documented by others and I have confirmed the accuracy of this documented note.    ANDREWS Esteban

## 2022-04-26 NOTE — TELEPHONE ENCOUNTER
----- Message from ANDREWS Esteban sent at 4/26/2022  8:50 AM EDT -----  Hematuria - continue URO - Aguirre

## 2022-05-06 ENCOUNTER — OFFICE VISIT (OUTPATIENT)
Dept: UROLOGY | Facility: CLINIC | Age: 27
End: 2022-05-06

## 2022-05-06 VITALS
WEIGHT: 225 LBS | HEIGHT: 70 IN | TEMPERATURE: 98.7 F | BODY MASS INDEX: 32.21 KG/M2 | HEART RATE: 70 BPM | SYSTOLIC BLOOD PRESSURE: 121 MMHG | DIASTOLIC BLOOD PRESSURE: 74 MMHG

## 2022-05-06 DIAGNOSIS — Z78.9 UNCIRCUMCISED MALE: ICD-10-CM

## 2022-05-06 DIAGNOSIS — N48.1 BALANITIS: ICD-10-CM

## 2022-05-06 DIAGNOSIS — R31.29 OTHER MICROSCOPIC HEMATURIA: Primary | ICD-10-CM

## 2022-05-06 DIAGNOSIS — R35.0 URINARY FREQUENCY: ICD-10-CM

## 2022-05-06 DIAGNOSIS — R23.8 SKIN IRRITATION: ICD-10-CM

## 2022-05-06 DIAGNOSIS — F41.8 ANXIETY ABOUT HEALTH: ICD-10-CM

## 2022-05-06 PROBLEM — R45.89 ANXIETY ABOUT HEALTH: Status: ACTIVE | Noted: 2022-05-06

## 2022-05-06 LAB
BILIRUB BLD-MCNC: NEGATIVE MG/DL
CLARITY, POC: CLEAR
COLOR UR: YELLOW
EXPIRATION DATE: ABNORMAL
GLUCOSE UR STRIP-MCNC: NEGATIVE MG/DL
KETONES UR QL: NEGATIVE
LEUKOCYTE EST, POC: NEGATIVE
Lab: ABNORMAL
NITRITE UR-MCNC: NEGATIVE MG/ML
PH UR: 5.5 [PH] (ref 5–8)
PROT UR STRIP-MCNC: NEGATIVE MG/DL
RBC # UR STRIP: NEGATIVE /UL
SP GR UR: 1.02 (ref 1–1.03)
UROBILINOGEN UR QL: NORMAL

## 2022-05-06 PROCEDURE — 99204 OFFICE O/P NEW MOD 45 MIN: CPT | Performed by: NURSE PRACTITIONER

## 2022-05-06 NOTE — PROGRESS NOTES
"Chief Complaint  Blood in Urine    Subjective          Chris Gomez 27 y.o. male presents to [facility]  Referral per ANDREWS Esteban for hematuria. Patient's mother accompanied to office but not present in room during evaluation.  Patient denies any history of gross hematuria. Only told he had blood in urine. According to previous medical records, he has been evaluated per Dr Aguirre 2018 for same complaint and normal findings per ov notes. Imaging reports noted CT abd/pelvis 2020 No abnormalities. RUQ Ultrasound 2021 normal. Several urinalysis indicating trace blood only. Patient reports \"blood\" had gone away and no problems until had soft drink last week. Burning went away. In the past had sensation of warm sensation of \" urine going down into my right leg\" \".. not sure if urine going into a vein\". Voices concern with possibly having some type of malignancy.  Few previous past  Urinalysis indicating trace blood. Denies any diagnosis of uti or kidney stones. Does not recall ever having been prescribed antibiotic for his urine. Questioned patient if any problems with dysuria when urinating and responded \"sometimes\". Nonspecific and not consistent. Determined distal urethra when it occurs.  Denies pelvic pain. Patient with visible appearance of abrasive type irritation and redness of glans penis during physical exam. Admits to masterbation 2-3 times a week and does not use any type of lubrication. He states that he has had redness of his penis in the past and resolves after urinates and \"washes\".  No voiding complaints, however observed urinary frequency during visit.    Review of Systems   Constitutional: Negative for chills and fever.   Gastrointestinal:        Has belching and does not tolerate dairy   Genitourinary: Negative for difficulty urinating, flank pain, hematuria and testicular pain.      Objective   Vital Signs:   /74   Pulse 70   Temp 98.7 °F (37.1 °C)   Ht 177.8 " "cm (70\")   Wt 102 kg (225 lb)   BMI 32.28 kg/m²      Past Surgical History:   Procedure Laterality Date   • WISDOM TOOTH EXTRACTION          Physical Exam  Vitals and nursing note reviewed.   Constitutional:       General: He is not in acute distress.     Appearance: Normal appearance. He is well-developed and well-groomed. He is not ill-appearing.      Comments: Ambulates without difficulty   Cardiovascular:      Rate and Rhythm: Normal rate and regular rhythm.   Pulmonary:      Effort: Pulmonary effort is normal.      Breath sounds: Normal breath sounds.   Abdominal:      General: There is no distension.      Palpations: Abdomen is soft. There is no mass.      Tenderness: There is no abdominal tenderness. There is no guarding or rebound.      Hernia: No hernia is present. There is no hernia in the left inguinal area or right inguinal area.   Genitourinary:     Penis: Uncircumcised. Erythema present. No phimosis or discharge.       Testes: Normal.         Right: Tenderness or swelling not present.         Left: Tenderness or swelling not present.      Epididymis:      Right: Normal. No tenderness.      Left: Normal. No tenderness.      Comments: erythema and irritation of glans penis and area distal penile shaft. mild edema meatus. Excessive redundant foreskin. Prostate small and slightly congested nontender  Musculoskeletal:         General: Normal range of motion.   Lymphadenopathy:      Lower Body: No right inguinal adenopathy. No left inguinal adenopathy.   Skin:     General: Skin is warm and dry.   Neurological:      Mental Status: He is alert and oriented to person, place, and time.   Psychiatric:         Thought Content: Thought content normal.      Comments: Dysphoric mood        Result Review :   The following data was reviewed by: PURA Mejia on 05/06/2022:  CMP    CMP 1/21/22   Glucose 97   BUN 6   Creatinine 0.91   eGFR Non African Am 101   Sodium 141   Potassium 3.9   Chloride 104 "   Calcium 9.3   Albumin 4.20   Total Bilirubin 0.3   Alkaline Phosphatase 116   AST (SGOT) 28   ALT (SGPT) 46 (A)   (A) Abnormal value            CBC    CBC 1/21/22   WBC 6.20   RBC 4.86   Hemoglobin 14.6   Hematocrit 43.5   MCV 89.5   MCH 30.0   MCHC 33.6   RDW 12.3   Platelets 188           UA    Urinalysis 12/16/21 4/26/22 5/6/22   Ketones, UA Negative Negative Negative   Leukocytes, UA Negative Negative Negative            POC Urinalysis Dipstick, Automated (05/06/2022 09:03)  US Abdomen Limited (03/22/2021 12:19)    Data reviewed: Radiologic studies ct abd/pelvis testicular us ruq us and Consultant notes urology        CT Abdomen Pelvis Without Contrast (06/29/2020 13:08)  US Scrotum & Testicles (04/17/2020 10:46)  US Abdomen Limited (03/22/2021 12:19)  POC Urinalysis Dipstick, Automated (05/06/2022 09:03)       Assessment and Plan    Diagnoses and all orders for this visit:    1. Other microscopic hematuria (Primary)  -     POC Urinalysis Dipstick, Automated  -     Cystoscopy; Future    2. Balanitis  -     Cystoscopy; Future  -     nystatin-triamcinolone (MYCOLOG II) 016081-0.1 UNIT/GM-% cream; Apply 1 application topically to the appropriate area as directed 2 (Two) Times a Day for 7 days. And prn  Dispense: 30 g; Refill: 0    3. Urinary frequency  -     Cystoscopy; Future    4. Skin irritation    5. Anxiety about health    Discussed with patient possible causes of microscopic hematuria and possible interstitial cystitis. Will schedule cystoscopy in office with Dr Aguirre to evaluate. Patient understands performed in office with local. Information of ic in men and ic diet provided. Also relayed trace blood in urine may be due to urethral or meatal inflammation and excoriation. Unable to determine also present in the past. Educated on  uncircumcised penis vs circumcised penis. He was informed that he is not circumcised. Instructions on hygiene and care of foreskin. Also advised on appropriate used of a  lubricant. rx cream to treat balanitis. Assured patient that no indications of a malignancy and at his young age and no risk factors, that is not a suspicion.   I spent 45 minutes caring for Chris on this date of service. This time includes time spent by me in the following activities:preparing for the visit, reviewing tests, obtaining and/or reviewing a separately obtained history, performing a medically appropriate examination and/or evaluation , counseling and educating the patient/family/caregiver, ordering medications, tests, or procedures, documenting information in the medical record and independently interpreting results and communicating that information with the patient/family/caregiver  Follow Up   Return in about 1 week (around 5/13/2022) for schedule cystoscopy in the office with dr de la torre .  Patient was given instructions and counseling regarding his condition or for health maintenance advice. Please see specific information pulled into the AVS if appropriate.     PURA Mejia

## 2022-05-09 ENCOUNTER — PATIENT ROUNDING (BHMG ONLY) (OUTPATIENT)
Dept: UROLOGY | Facility: CLINIC | Age: 27
End: 2022-05-09

## 2022-05-09 NOTE — PROGRESS NOTES
May 9, 2022    Hello, may I speak with Chris Gomez?    My name is Talita    I am  with [facility]  [address]  [address]  [place] [id number] [phone].    Before we get started may I verify your date of birth? 1995    I am calling to officially welcome you to our practice and ask about your recent visit. Is this a good time to talk? Spoke with patients mother PT was not home. I stated we was calling to welcome him to our practice. Please call if he needs anything      Tell me about your visit with us. What things went well?       We're always looking for ways to make our patients' experiences even better. Do you have recommendations on ways we may improve?    Overall were you satisfied with your first visit to our practice?        I appreciate you taking the time to speak with me today. Is there anything else I can do for you?       Thank you, and have a great day.

## 2022-05-11 NOTE — PROGRESS NOTES
Chief Complaint        Gastrointestinal food sensitivity    History of Present Illness      Chris Gomez is a 27 y.o. male who presents to [facility] as a new patient who presents to the office today with a history of celiac disease, milk sensitivity, abdominal bloating, belching, reflux, nausea and rectal bleeding.  Patient reports has had ongoing sensitivity to milk for many years.  He reports having lab testing performed displaying celiac disease.  He has cut back on his gluten intake and has had improvement in his nausea, abdominal bloating and belching.  Patient reports if he is exposed to these things he feels as though he has had food poisoning with abdominal discomfort, bloating, belching, reflux, and nausea.  He reports at one point he ate dates and had severe rectal bleeding.  He denies any family history of colon cancer, gastric cancer, Crohn's disease or ulcerative colitis.  Patient denies fever, vomiting, weight loss, night sweats, melena, hematemesis.     Patient has never had a colonoscopy or EGD.    Stool studies performed on 01/24/2022 negative stool studies.    Labs performed on 01/21/2022 celiac TTG IgA positive at 4  H. pylori blood test 3/11/2021 negative.    Results       Result Review :   The following data was reviewed by: PURA Hillman on 05/16/2022     CMP    CMP 1/21/22   Glucose 97   BUN 6   Creatinine 0.91   eGFR Non African Am 101   Sodium 141   Potassium 3.9   Chloride 104   Calcium 9.3   Albumin 4.20   Total Bilirubin 0.3   Alkaline Phosphatase 116   AST (SGOT) 28   ALT (SGPT) 46 (A)   (A) Abnormal value            CBC    CBC 1/21/22   WBC 6.20   RBC 4.86   Hemoglobin 14.6   Hematocrit 43.5   MCV 89.5   MCH 30.0   MCHC 33.6   RDW 12.3   Platelets 188             Lipase   Lipase   Date Value Ref Range Status   03/11/2021 50 5 - 51 U/L Final     Amylase   Amylase   Date Value Ref Range Status   03/11/2021 53 30 - 110 U/L Final     Iron  "Profile No results found for: IRON, TIBC, LABIRON, TRANSFERRIN  Liver Workup   IgA   Date Value Ref Range Status   01/21/2022 197 90 - 386 mg/dL Final            Past Medical History       Past Medical History:   Diagnosis Date   • Asthma    • Food poisoning        Past Surgical History:   Procedure Laterality Date   • WISDOM TOOTH EXTRACTION           Current Outpatient Medications:   •  diphenhydrAMINE (BENADRYL) 25 mg capsule, As Needed. prn, Disp: , Rfl:   •  sucralfate (CARAFATE) 1 g tablet, prn, Disp: , Rfl:   •  polyethylene glycol (GoLYTELY) 236 g solution, Starting at noon on day prior to procedure, drink 8 ounces every 30 minutes until all gone or stools are clear. May add flavor packet., Disp: 4000 mL, Rfl: 0     Allergies   Allergen Reactions   • Lac Bovis GI Intolerance   • Contrast Dye Rash       Family History   Problem Relation Age of Onset   • Lung cancer Father         MALIGNANT   • Nephrolithiasis Maternal Uncle    • Nephrolithiasis Paternal Aunt    • Diabetes Maternal Grandmother    • Diabetes Maternal Grandfather    • Breast cancer Other 70   • Kidney cancer Other    • Heart disease Other    • Stroke Other    • Colon cancer Neg Hx         Social History     Social History Narrative   • Not on file       Objective       Objective     Vital Signs:   /81   Pulse 68   Ht 177.8 cm (70\")   Wt 102 kg (224 lb)   SpO2 100%   BMI 32.14 kg/m²     Body mass index is 32.14 kg/m².    Physical Exam  Constitutional:       General: He is not in acute distress.     Appearance: Normal appearance. He is well-developed and normal weight.   Eyes:      Conjunctiva/sclera: Conjunctivae normal.      Pupils: Pupils are equal, round, and reactive to light.      Visual Fields: Right eye visual fields normal and left eye visual fields normal.   Cardiovascular:      Rate and Rhythm: Normal rate and regular rhythm.      Heart sounds: Normal heart sounds.   Pulmonary:      Effort: Pulmonary effort is normal. No " retractions.      Breath sounds: Normal breath sounds and air entry.      Comments: Inspection of chest: normal appearance  Abdominal:      General: Bowel sounds are normal.      Palpations: Abdomen is soft.      Tenderness: There is no abdominal tenderness.      Comments: No appreciable hepatosplenomegaly   Musculoskeletal:      Cervical back: Neck supple.      Right lower leg: No edema.      Left lower leg: No edema.   Lymphadenopathy:      Cervical: No cervical adenopathy.   Skin:     Findings: No lesion.      Comments: Turgor normal   Neurological:      Mental Status: He is alert and oriented to person, place, and time.   Psychiatric:         Mood and Affect: Mood and affect normal.              Assessment & Plan          Assessment and Plan    Diagnoses and all orders for this visit:    1. Celiac disease (Primary)  -     COVID-19,APTIMA PANTHER(JUDD),[facility], NP/OP SWAB IN UTM/VTM/SALINE TRANSPORT MEDIA,24 HR TAT - Swab, Nasopharynx; Future    2. Abdominal bloating  -     COVID-19,APTIMA PANTHER(JUDD),[facility], NP/OP SWAB IN UTM/VTM/SALINE TRANSPORT MEDIA,24 HR TAT - Swab, Nasopharynx; Future    3. Belching  -     COVID-19,APTIMA PANTHER(JUDD),[facility], NP/OP SWAB IN UTM/VTM/SALINE TRANSPORT MEDIA,24 HR TAT - Swab, Nasopharynx; Future    4. Gastroesophageal reflux disease, unspecified whether esophagitis present  -     COVID-19,APTIMA PANTHER(JUDD),[facility], NP/OP SWAB IN UTM/VTM/SALINE TRANSPORT MEDIA,24 HR TAT - Swab, Nasopharynx; Future    5. History of cow's milk protein sensitivity  -     COVID-19,APTIMA PANTHER(JUDD),[facility], NP/OP SWAB IN UTM/VTM/SALINE TRANSPORT MEDIA,24 HR TAT - Swab, Nasopharynx; Future    6. Nausea  -     COVID-19,APTIMA PANTHER(JUDD),[facility], NP/OP SWAB IN UTM/VTM/SALINE TRANSPORT MEDIA,24 HR TAT - Swab, Nasopharynx; Future    7. Rectal bleeding    Other orders  -     polyethylene glycol (GoLYTELY) 236 g solution; Starting at noon on day prior to  procedure, drink 8 ounces every 30 minutes until all gone or stools are clear. May add flavor packet.  Dispense: 4000 mL; Refill: 0      27-year-old male presenting the office today as a new patient who presents to the office today with a history of celiac disease, milk sensitivity, abdominal bloating, belching, reflux, nausea and rectal bleeding.  I have recommended that the patient undergo further evaluation with an EGD and colonoscopy.  I have discussed this procedure in detail with the patient.  I have discussed the risks, benefits and alternatives.  I have discussed the risk of anesthesia, bleeding and perforation.  Patient understands these risks, benefits and alternatives and wishes to proceed.  I will schedule him at his earliest convenience.  Patient will continue Carafate as needed for reflux and nausea.  We have discussed possible PPI therapy patient would like to defer at this time.  We have discussed gluten-free diet.  We will follow-up in office after endoscopy.  Patient agreeable to this plan will call with any questions or concerns.            Follow Up       Follow Up   Return for Follow up after endoscopy in office.  Patient was given instructions and counseling regarding his condition or for health maintenance advice. Please see specific information pulled into the AVS if appropriate.

## 2022-05-16 ENCOUNTER — OFFICE VISIT (OUTPATIENT)
Dept: GASTROENTEROLOGY | Facility: CLINIC | Age: 27
End: 2022-05-16

## 2022-05-16 ENCOUNTER — PREP FOR SURGERY (OUTPATIENT)
Dept: OTHER | Facility: HOSPITAL | Age: 27
End: 2022-05-16

## 2022-05-16 VITALS
BODY MASS INDEX: 32.07 KG/M2 | SYSTOLIC BLOOD PRESSURE: 129 MMHG | HEIGHT: 70 IN | WEIGHT: 224 LBS | OXYGEN SATURATION: 100 % | DIASTOLIC BLOOD PRESSURE: 81 MMHG | HEART RATE: 68 BPM

## 2022-05-16 DIAGNOSIS — K90.0 CELIAC DISEASE: Primary | ICD-10-CM

## 2022-05-16 DIAGNOSIS — K62.5 RECTAL BLEEDING: ICD-10-CM

## 2022-05-16 DIAGNOSIS — Z91.011 HISTORY OF COW'S MILK PROTEIN SENSITIVITY: ICD-10-CM

## 2022-05-16 DIAGNOSIS — R14.0 ABDOMINAL BLOATING: ICD-10-CM

## 2022-05-16 DIAGNOSIS — R11.0 NAUSEA: ICD-10-CM

## 2022-05-16 DIAGNOSIS — K21.9 GASTROESOPHAGEAL REFLUX DISEASE, UNSPECIFIED WHETHER ESOPHAGITIS PRESENT: ICD-10-CM

## 2022-05-16 DIAGNOSIS — R14.2 BELCHING: ICD-10-CM

## 2022-05-16 PROCEDURE — 99214 OFFICE O/P EST MOD 30 MIN: CPT | Performed by: NURSE PRACTITIONER

## 2022-05-16 NOTE — PATIENT INSTRUCTIONS
Food Choices for Gastroesophageal Reflux Disease, Adult  When you have gastroesophageal reflux disease (GERD), the foods you eat and your eating habits are very important. Choosing the right foods can help ease your discomfort. Think about working with a food expert (dietitian) to help you make good choices.  What are tips for following this plan?  Reading food labels  Look for foods that are low in saturated fat. Foods that may help with your symptoms include:  Foods that have less than 5% of daily value (DV) of fat.  Foods that have 0 grams of trans fat.  Cooking  Do not holt your food.  Cook your food by baking, steaming, grilling, or broiling. These are all methods that do not need a lot of fat for cooking.  To add flavor, try to use herbs that are low in spice and acidity.  Meal planning    Choose healthy foods that are low in fat, such as:  Fruits and vegetables.  Whole grains.  Low-fat dairy products.  Lean meats, fish, and poultry.  Eat small meals often instead of eating 3 large meals each day. Eat your meals slowly in a place where you are relaxed. Avoid bending over or lying down until 2-3 hours after eating.  Limit high-fat foods such as fatty meats or fried foods.  Limit your intake of fatty foods, such as oils, butter, and shortening.  Avoid the following as told by your doctor:  Foods that cause symptoms. These may be different for different people. Keep a food diary to keep track of foods that cause symptoms.  Alcohol.  Drinking a lot of liquid with meals.  Eating meals during the 2-3 hours before bed.    Lifestyle  Stay at a healthy weight. Ask your doctor what weight is healthy for you. If you need to lose weight, work with your doctor to do so safely.  Exercise for at least 30 minutes on 5 or more days each week, or as told by your doctor.  Wear loose-fitting clothes.  Do not smoke or use any products that contain nicotine or tobacco. If you need help quitting, ask your doctor.  Sleep with the head  of your bed higher than your feet. Use a wedge under the mattress or blocks under the bed frame to raise the head of the bed.  Chew sugar-free gum after meals.  What foods should eat?    Eat a healthy, well-balanced diet of fruits, vegetables, whole grains, low-fat dairy products, lean meats, fish, and poultry. Each person is different.  Foods that may cause symptoms in one person may not cause any symptoms in another person. Work with your doctor to find foods that are safe for you.  The items listed above may not be a complete list of what you can eat and drink. Contact a food expert for more options.  What foods should I avoid?  Limiting some of these foods may help in managing the symptoms of GERD. Everyone is different. Talk with a food expert or your doctor to help you find the exact foods to avoid, if any.  Fruits  Any fruits prepared with added fat. Any fruits that cause symptoms. For some people, this may include citrus fruits, such as oranges, grapefruit, pineapple, and lyle.  Vegetables  Deep-fried vegetables. French fries. Any vegetables prepared with added fat. Any vegetables that cause symptoms. For some people, this may include tomatoes and tomato products, chili peppers, onions and garlic, and horseradish.  Grains  Pastries or quick breads with added fat.  Meats and other proteins  High-fat meats, such as fatty beef or pork, hot dogs, ribs, ham, sausage, salami, and julian. Fried meat or protein, including fried fish and fried chicken. Nuts and nut butters, in large amounts.  Dairy  Whole milk and chocolate milk. Sour cream. Cream. Ice cream. Cream cheese. Milkshakes.  Fats and oils  Butter. Margarine. Shortening. Ghee.  Beverages  Coffee and tea, with or without caffeine. Carbonated beverages. Sodas. Energy drinks. Fruit juice made with acidic fruits, such as orange or grapefruit. Tomato juice. Alcoholic drinks.  Sweets and desserts  Chocolate and cocoa. Donuts.  Seasonings and condiments  Pepper.  Peppermint and spearmint. Added salt. Any condiments, herbs, or seasonings that cause symptoms. For some people, this may include devlin, hot sauce, or vinegar-based salad dressings.  The items listed above may not be a complete list of what you should not eat and drink. Contact a food expert for more options.  Questions to ask your doctor  Diet and lifestyle changes are often the first steps that are taken to manage symptoms of GERD. If diet and lifestyle changes do not help, talk with your doctor about taking medicines.  Where to find more information  [employer] G[employer]intestinal [employer]: aboutgerd.org  Summary  When you have GERD, food and lifestyle choices are very important in easing your symptoms.  Eat small meals often instead of 3 large meals a day. Eat your meals slowly and in a place where you are relaxed.  Avoid bending over or lying down until 2-3 hours after eating.  Limit high-fat foods such as fatty meats or fried foods.  This information is not intended to replace advice given to you by your health care provider. Make sure you discuss any questions you have with your health care provider.  Document Revised: 06/28/2021 Document Reviewed: 06/28/2021  Elsevier Patient Education © 2021 [employer]

## 2022-05-17 ENCOUNTER — PROCEDURE VISIT (OUTPATIENT)
Dept: UROLOGY | Facility: CLINIC | Age: 27
End: 2022-05-17

## 2022-05-17 DIAGNOSIS — R31.29 MICROSCOPIC HEMATURIA: Primary | ICD-10-CM

## 2022-05-17 PROCEDURE — 52000 CYSTOURETHROSCOPY: CPT | Performed by: UROLOGY

## 2022-05-17 NOTE — PROGRESS NOTES
Cystoscopy    Date/Time: 5/17/2022 9:29 AM  Performed by: Gianni Aguirre MD  Authorized by: Gianni Aguirre MD   Preparation: Patient was prepped and draped in the usual sterile fashion.  Local anesthesia used: yes    Anesthesia:  Local anesthesia used: yes  Local Anesthetic: topical anesthetic  Anesthetic total: 12 mL    Sedation:  Patient sedated: no        Indication.  Microscopic hematuria    Patient was placed in lithotomy position.  Thorough scrubbing of lower abdomen and external genitalia was performed with Hibiclens.  18 Olympus flexible cystoscope was inserted into the urethra, which was normal.  Prostate gland is wide open and bladder neck is normal.  Both ureteral orifices are normal.  Blood vessels in the urinary bladder are little congested.  I filled his bladder with water and patient does not have any petechial hemorrhages in the urinary bladder.  I do not see any bleeding, tumor or fistula in the urinary bladder.  Flexible cystoscope was removed and patient tolerated the procedure very well.    Patient does masturbate 2 or 3 times a week and last time when Ms. Luz Baker saw him there was some erosion of the meatus.  That could be the cause of his microscopic hematuria.  I have asked him to start interstitial cystitis diet which should help congestion of the urinary bladder.  I do not see any bladder tumors, urethral tumors or any other problem in the urinary bladder or urethra.  We will be happy to recheck him in 6 months time.    I have asked him to restrain from smoking tobacco, chewing tobacco or anything like that which should increase the congestion of his urinary bladder.

## 2022-05-18 ENCOUNTER — TELEPHONE (OUTPATIENT)
Dept: UROLOGY | Facility: CLINIC | Age: 27
End: 2022-05-18

## 2022-05-18 NOTE — TELEPHONE ENCOUNTER
Caller: NIRMALA LARSEN    Relationship: MOTHER    Best call back number: [phone]    What form or medical record are you requesting: AFTER VISIT SUMMARY     Who is requesting this form or medical record from you: NIRMALA LARSEN    How would you like to receive the form or medical records (pick-up, mail, fax): FAX  If fax, what is the fax number: [phone]    Additional notes:     NIRMALA LARSEN CALLED REQUESTING THE AFTER VISIT SUMMARY FROM PROCEDURE ON 5/16/22 BE FAXED TO HER [phone].

## 2022-05-25 ENCOUNTER — PATIENT ROUNDING (BHMG ONLY) (OUTPATIENT)
Dept: GASTROENTEROLOGY | Facility: CLINIC | Age: 27
End: 2022-05-25

## 2022-05-25 NOTE — PROGRESS NOTES
May 25, 2022    Hello, may I speak with Chris Gomez?    My name is Corey    I am  with [facility]  [address] [place] [address] [phone].    Before we get started may I verify your date of birth? 1995    I am calling to officially welcome you to our practice and ask about your recent visit. Is this a good time to talk? No - left pt VM.     Tell me about your visit with us. What things went well?        We're always looking for ways to make our patients' experiences even better. Do you have recommendations on ways we may improve?      Overall were you satisfied with your first visit to our practice?        I appreciate you taking the time to speak with me today. Is there anything else I can do for you?       Thank you, and have a great day.

## 2022-06-17 ENCOUNTER — OFFICE VISIT (OUTPATIENT)
Dept: FAMILY MEDICINE CLINIC | Facility: CLINIC | Age: 27
End: 2022-06-17

## 2022-06-17 VITALS
DIASTOLIC BLOOD PRESSURE: 80 MMHG | OXYGEN SATURATION: 99 % | BODY MASS INDEX: 32.07 KG/M2 | WEIGHT: 224 LBS | HEIGHT: 70 IN | SYSTOLIC BLOOD PRESSURE: 127 MMHG | HEART RATE: 83 BPM

## 2022-06-17 DIAGNOSIS — Z00.00 ANNUAL PHYSICAL EXAM: Primary | ICD-10-CM

## 2022-06-17 DIAGNOSIS — E66.09 CLASS 1 OBESITY DUE TO EXCESS CALORIES WITH SERIOUS COMORBIDITY AND BODY MASS INDEX (BMI) OF 32.0 TO 32.9 IN ADULT: Chronic | ICD-10-CM

## 2022-06-17 PROCEDURE — 3008F BODY MASS INDEX DOCD: CPT | Performed by: PHYSICIAN ASSISTANT

## 2022-06-17 PROCEDURE — 99395 PREV VISIT EST AGE 18-39: CPT | Performed by: PHYSICIAN ASSISTANT

## 2022-06-17 PROCEDURE — 2014F MENTAL STATUS ASSESS: CPT | Performed by: PHYSICIAN ASSISTANT

## 2022-06-17 NOTE — PROGRESS NOTES
"Chief Complaint  Annual Exam    Subjective          Chris Gomez presents to [facility]  Pt here today for annual exam.    Pt reports no problems or concerns.    Pt scheduled for EGD and Colonoscopy with Dr. Cunningham on 08/02/22.    Pt recenrlty had a cystoscopy on 05/17/22 by Dr. Aguirre.     No CP, SOA, HA    Preventative Counseling:  discussed with patient healthy diet and active lifestyle modifications.  Adequate sleep, daily exercise, hobbies, etc.  Avoid alcohol in excess, avoid tobacco and illicit drugs. Dental visits twice yearly for routine teeth cleanings.      Past Medical History:   Diagnosis Date   • Asthma    • Food poisoning       Family History   Problem Relation Age of Onset   • Lung cancer Father         MALIGNANT   • Nephrolithiasis Maternal Uncle    • Nephrolithiasis Paternal Aunt    • Diabetes Maternal Grandmother    • Diabetes Maternal Grandfather    • Breast cancer Other 70   • Kidney cancer Other    • Heart disease Other    • Stroke Other    • Colon cancer Neg Hx       Past Surgical History:   Procedure Laterality Date   • WISDOM TOOTH EXTRACTION          Current Outpatient Medications:   •  diphenhydrAMINE (BENADRYL) 25 mg capsule, As Needed. prn, Disp: , Rfl:   •  polyethylene glycol (GoLYTELY) 236 g solution, Starting at noon on day prior to procedure, drink 8 ounces every 30 minutes until all gone or stools are clear. May add flavor packet., Disp: 4000 mL, Rfl: 0  •  sucralfate (CARAFATE) 1 g tablet, prn, Disp: , Rfl:     Objective     Vital Signs:     /80   Pulse 83   Ht 177.8 cm (70\")   Wt 102 kg (224 lb)   SpO2 99%   BMI 32.14 kg/m²    Estimated body mass index is 32.14 kg/m² as calculated from the following:    Height as of this encounter: 177.8 cm (70\").    Weight as of this encounter: 102 kg (224 lb).     Wt Readings from Last 3 Encounters:   06/17/22 102 kg (224 lb)   05/16/22 102 kg (224 lb)   05/06/22 102 kg (225 lb)     BP " Readings from Last 3 Encounters:   06/17/22 127/80   05/16/22 129/81   05/06/22 121/74     Physical Exam  Vitals and nursing note reviewed.   Constitutional:       Appearance: Normal appearance. He is obese.   HENT:      Head: Normocephalic and atraumatic.      Right Ear: Tympanic membrane and ear canal normal.      Left Ear: Tympanic membrane and ear canal normal.      Nose: Nose normal.      Mouth/Throat:      Mouth: Mucous membranes are moist.      Pharynx: Oropharynx is clear.   Eyes:      Conjunctiva/sclera: Conjunctivae normal.      Pupils: Pupils are equal, round, and reactive to light.   Cardiovascular:      Rate and Rhythm: Normal rate and regular rhythm.      Heart sounds: Normal heart sounds.   Pulmonary:      Effort: Pulmonary effort is normal.      Breath sounds: Normal breath sounds.   Abdominal:      General: Abdomen is flat.   Musculoskeletal:      Cervical back: Neck supple.   Neurological:      Mental Status: He is alert.   Psychiatric:         Mood and Affect: Mood normal.         Behavior: Behavior normal.        Result Review :     Common labs    Common Labsle 1/21/22 1/21/22    0822 0822   Glucose  97   BUN  6   Creatinine  0.91   eGFR Non African Am  101   Sodium  141   Potassium  3.9   Chloride  104   Calcium  9.3   Albumin  4.20   Total Bilirubin  0.3   Alkaline Phosphatase  116   AST (SGOT)  28   ALT (SGPT)  46 (A)   WBC 6.20    Hemoglobin 14.6    Hematocrit 43.5    Platelets 188    (A) Abnormal value                          Assessment and Plan      Diagnoses and all orders for this visit:    1. Annual physical exam (Primary)    2. Class 1 obesity due to excess calories with serious comorbidity and body mass index (BMI) of 32.0 to 32.9 in adult  Comments:  Diet and exercise discussed  Overview:  Disc diet and exercise       Continue GI and URO    Follow Up     Return in about 6 months (around 12/17/2022).    Patient was given instructions and counseling regarding his condition or for  health maintenance advice. Please see specific information pulled into the AVS if appropriate.     I have reviewed information obtained and documented by others and I have confirmed the accuracy of this documented note.    ANDREWS Esteban

## 2022-06-21 ENCOUNTER — TELEPHONE (OUTPATIENT)
Dept: FAMILY MEDICINE CLINIC | Facility: CLINIC | Age: 27
End: 2022-06-21

## 2022-06-21 NOTE — TELEPHONE ENCOUNTER
Caller: Chris Gomez    Relationship: Self    Best call back number: [phone]    Who are you requesting to speak with (clinical staff, provider,  specific staff member): PROVIDER    What was the call regarding: PATIENT STATES INTERSTITIAL CYSTITIS WAS THE OPINION OF WHAT IS WRONG WITH THE PATIENT BY ANOTHER PROVIDER. PATIENT STATES HE JUST WANTED TO CALL AND INFORM SAMUEL KELLY.    Do you require a callback: IF NECESSARY

## 2022-07-27 ENCOUNTER — LAB (OUTPATIENT)
Dept: LAB | Facility: HOSPITAL | Age: 27
End: 2022-07-27

## 2022-07-27 DIAGNOSIS — R11.0 NAUSEA: ICD-10-CM

## 2022-07-27 DIAGNOSIS — K90.0 CELIAC DISEASE: ICD-10-CM

## 2022-07-27 DIAGNOSIS — R14.0 ABDOMINAL BLOATING: ICD-10-CM

## 2022-07-27 DIAGNOSIS — R14.2 BELCHING: ICD-10-CM

## 2022-07-27 DIAGNOSIS — K21.9 GASTROESOPHAGEAL REFLUX DISEASE, UNSPECIFIED WHETHER ESOPHAGITIS PRESENT: ICD-10-CM

## 2022-07-27 DIAGNOSIS — Z91.011 HISTORY OF COW'S MILK PROTEIN SENSITIVITY: ICD-10-CM

## 2022-07-27 LAB — SARS-COV-2 RNA PNL SPEC NAA+PROBE: NOT DETECTED

## 2022-07-27 PROCEDURE — U0004 COV-19 TEST NON-CDC HGH THRU: HCPCS

## 2022-08-02 ENCOUNTER — ANESTHESIA EVENT (OUTPATIENT)
Dept: GASTROENTEROLOGY | Facility: HOSPITAL | Age: 27
End: 2022-08-02

## 2022-08-02 ENCOUNTER — HOSPITAL ENCOUNTER (OUTPATIENT)
Facility: HOSPITAL | Age: 27
Setting detail: HOSPITAL OUTPATIENT SURGERY
Discharge: HOME | End: 2022-08-02
Attending: INTERNAL MEDICINE | Admitting: INTERNAL MEDICINE

## 2022-08-02 ENCOUNTER — ANESTHESIA (OUTPATIENT)
Dept: GASTROENTEROLOGY | Facility: HOSPITAL | Age: 27
End: 2022-08-02

## 2022-08-02 VITALS
HEART RATE: 53 BPM | HEIGHT: 69 IN | WEIGHT: 218.7 LBS | TEMPERATURE: 97.8 F | DIASTOLIC BLOOD PRESSURE: 55 MMHG | SYSTOLIC BLOOD PRESSURE: 96 MMHG | RESPIRATION RATE: 14 BRPM | OXYGEN SATURATION: 100 % | BODY MASS INDEX: 32.39 KG/M2

## 2022-08-02 DIAGNOSIS — R14.0 ABDOMINAL BLOATING: ICD-10-CM

## 2022-08-02 DIAGNOSIS — Z91.011 HISTORY OF COW'S MILK PROTEIN SENSITIVITY: ICD-10-CM

## 2022-08-02 DIAGNOSIS — K21.9 GASTROESOPHAGEAL REFLUX DISEASE, UNSPECIFIED WHETHER ESOPHAGITIS PRESENT: ICD-10-CM

## 2022-08-02 DIAGNOSIS — R14.2 BELCHING: ICD-10-CM

## 2022-08-02 DIAGNOSIS — K90.0 CELIAC DISEASE: ICD-10-CM

## 2022-08-02 DIAGNOSIS — K62.5 RECTAL BLEEDING: ICD-10-CM

## 2022-08-02 DIAGNOSIS — R11.0 NAUSEA: ICD-10-CM

## 2022-08-02 PROCEDURE — 25010000002 PROPOFOL 10 MG/ML EMULSION: Performed by: NURSE ANESTHETIST, CERTIFIED REGISTERED

## 2022-08-02 PROCEDURE — 45378 DIAGNOSTIC COLONOSCOPY: CPT | Performed by: INTERNAL MEDICINE

## 2022-08-02 PROCEDURE — 43239 EGD BIOPSY SINGLE/MULTIPLE: CPT | Performed by: INTERNAL MEDICINE

## 2022-08-02 PROCEDURE — 88305 TISSUE EXAM BY PATHOLOGIST: CPT | Performed by: INTERNAL MEDICINE

## 2022-08-02 RX ORDER — LIDOCAINE HYDROCHLORIDE 20 MG/ML
INJECTION, SOLUTION EPIDURAL; INFILTRATION; INTRACAUDAL; PERINEURAL AS NEEDED
Status: DISCONTINUED | OUTPATIENT
Start: 2022-08-02 | End: 2022-08-02 | Stop reason: SURG

## 2022-08-02 RX ORDER — PROPOFOL 10 MG/ML
VIAL (ML) INTRAVENOUS AS NEEDED
Status: DISCONTINUED | OUTPATIENT
Start: 2022-08-02 | End: 2022-08-02 | Stop reason: SURG

## 2022-08-02 RX ORDER — SODIUM CHLORIDE, SODIUM LACTATE, POTASSIUM CHLORIDE, CALCIUM CHLORIDE 600; 310; 30; 20 MG/100ML; MG/100ML; MG/100ML; MG/100ML
30 INJECTION, SOLUTION INTRAVENOUS CONTINUOUS
Status: DISCONTINUED | OUTPATIENT
Start: 2022-08-02 | End: 2022-08-02 | Stop reason: HOSPADM

## 2022-08-02 RX ADMIN — PROPOFOL 50 MG: 10 INJECTION, EMULSION INTRAVENOUS at 10:29

## 2022-08-02 RX ADMIN — LIDOCAINE HYDROCHLORIDE 100 MG: 20 INJECTION, SOLUTION EPIDURAL; INFILTRATION; INTRACAUDAL; PERINEURAL at 10:29

## 2022-08-02 RX ADMIN — SODIUM CHLORIDE, POTASSIUM CHLORIDE, SODIUM LACTATE AND CALCIUM CHLORIDE 30 ML/HR: 600; 310; 30; 20 INJECTION, SOLUTION INTRAVENOUS at 09:48

## 2022-08-02 RX ADMIN — PROPOFOL 200 MCG/KG/MIN: 10 INJECTION, EMULSION INTRAVENOUS at 10:29

## 2022-08-02 NOTE — ANESTHESIA PREPROCEDURE EVALUATION
Anesthesia Evaluation     Patient summary reviewed and Nursing notes reviewed   no history of anesthetic complications:  NPO Solid Status: > 8 hours  NPO Liquid Status: > 2 hours           Airway   Mallampati: II  TM distance: >3 FB  No difficulty expected  Dental - normal exam     Pulmonary - normal exam   (+) asthma (well-controlled),  Cardiovascular - negative cardio ROS and normal exam        Neuro/Psych  (+) psychiatric history,    GI/Hepatic/Renal/Endo    (+) obesity,  GERD, GI bleeding lower ,     Musculoskeletal (-) negative ROS    Abdominal  - normal exam   Substance History - negative use     OB/GYN negative ob/gyn ROS         Other - negative ROS       ROS/Med Hx Other:                     Anesthesia Plan    ASA 2     general     (Total IV Anesthesia    Patient understands anesthesia not responsible for dental damage.  )  intravenous induction     Anesthetic plan, risks, benefits, and alternatives have been provided, discussed and informed consent has been obtained with: patient.    Plan discussed with CRNA.        CODE STATUS:

## 2022-08-02 NOTE — ANESTHESIA POSTPROCEDURE EVALUATION
Patient: Chris Gomez    Procedure Summary     Date: 08/02/22 Room / Location: [facility] ENDOSCOPY 2 / [facility] ENDOSCOPY    Anesthesia Start: 1027 Anesthesia Stop: 1101    Procedures:       ESOPHAGOGASTRODUODENOSCOPY WITH BX (N/A )      COLONOSCOPY (N/A ) Diagnosis:       Celiac disease      Abdominal bloating      Gastroesophageal reflux disease, unspecified whether esophagitis present      Belching      History of cow's milk protein sensitivity      Nausea      Rectal bleeding      (Celiac disease [K90.0])      (Abdominal bloating [R14.0])      (Gastroesophageal reflux disease, unspecified whether esophagitis present [K21.9])      (Belching [R14.2])      (History of cow's milk protein sensitivity [Z91.011])      (Nausea [R11.0])      (Rectal bleeding [K62.5])    Surgeons: Giacomo Cunningham MD Provider: Charlene Merritt DO    Anesthesia Type: general ASA Status: 2          Anesthesia Type: general    Vitals  Vitals Value Taken Time   /64 08/02/22 1125   Temp 36.6 °C (97.8 °F) 08/02/22 1125   Pulse 73 08/02/22 1129   Resp 14 08/02/22 1125   SpO2 98 % 08/02/22 1129   Vitals shown include unvalidated device data.        Post Anesthesia Care and Evaluation    Patient location during evaluation: bedside  Patient participation: complete - patient participated  Level of consciousness: awake  Pain management: adequate    Airway patency: patent  Anesthetic complications: No anesthetic complications  PONV Status: none  Cardiovascular status: acceptable and stable  Respiratory status: acceptable  Hydration status: acceptable    Comments: An Anesthesiologist personally participated in the most demanding procedures (including induction and emergence if applicable) in the anesthesia plan, monitored the course of anesthesia administration at frequent intervals and remained physically present and available for immediate diagnosis and treatment of emergencies.

## 2022-08-02 NOTE — H&P
"Pre Procedure History & Physical    Chief Complaint:   Altered bowel habits  Bloating  gerd  Rectal bleeding  nausea    Subjective     HPI:   As above    Past Medical History:   Past Medical History:   Diagnosis Date   • Asthma     as child   • Food poisoning    • Ingrown toenail of both feet     great toe both feet       Past Surgical History:  Past Surgical History:   Procedure Laterality Date   • WISDOM TOOTH EXTRACTION         Family History:  Family History   Problem Relation Age of Onset   • Lung cancer Father         MALIGNANT   • Nephrolithiasis Maternal Uncle    • Nephrolithiasis Paternal Aunt    • Diabetes Maternal Grandmother    • Diabetes Maternal Grandfather    • Breast cancer Other 70   • Kidney cancer Other    • Heart disease Other    • Stroke Other    • Colon cancer Neg Hx    • Malig Hyperthermia Neg Hx        Social History:   reports that he has never smoked. He has never used smokeless tobacco. He reports that he does not drink alcohol and does not use drugs.    Medications:   Medications Prior to Admission   Medication Sig Dispense Refill Last Dose   • diphenhydrAMINE (BENADRYL) 25 mg capsule Take 25 mg by mouth As Needed. prn   More than a month at Unknown time       Allergies:  Lac bovis and Contrast dye        Objective     Blood pressure 132/85, pulse 82, temperature 97.1 °F (36.2 °C), temperature source Temporal, resp. rate 20, height 175.3 cm (69.02\"), weight 99.2 kg (218 lb 11.1 oz), SpO2 98 %.    Physical Exam   Constitutional: Pt is oriented to person, place, and time and well-developed, well-nourished, and in no distress.   Mouth/Throat: Oropharynx is clear and moist.   Neck: Normal range of motion.   Cardiovascular: Normal rate, regular rhythm and normal heart sounds.    Pulmonary/Chest: Effort normal and breath sounds normal.   Abdominal: Soft. Nontender  Skin: Skin is warm and dry.   Psychiatric: Mood, memory, affect and judgment normal.     Assessment & Plan     Diagnosis:  Altered " bowel habits  Bloating  gerd  Rectal bleeding  nausea      Anticipated Surgical Procedure:  egd  colonoscopy    The risks, benefits, and alternatives of this procedure have been discussed with the patient or the responsible party- the patient understands and agrees to proceed.

## 2022-08-03 LAB
CYTO UR: NORMAL
LAB AP CASE REPORT: NORMAL
LAB AP CLINICAL INFORMATION: NORMAL
PATH REPORT.FINAL DX SPEC: NORMAL
PATH REPORT.GROSS SPEC: NORMAL

## 2022-08-18 ENCOUNTER — TELEPHONE (OUTPATIENT)
Dept: FAMILY MEDICINE CLINIC | Facility: CLINIC | Age: 27
End: 2022-08-18

## 2022-08-18 NOTE — TELEPHONE ENCOUNTER
Caller: Chris Gomez    Relationship: Self    Best call back number: [phone]    What test was performed: COLONOSCOPY AND UPPER GI    When was the test performed: 08/2/22    Where was the test performed: [facility]    Additional notes: THE PATIENT STATED HE HAD AN UPPGER GI AND COLONOSCOPY SCOPES AND WOULD LIKE A CALL BACK TO DISCUSS HIS RESULTS ASAP. HE WOULD LIKE A DETAILED VOICEMAIL IF HE MISSES THE CALL.

## 2022-09-14 ENCOUNTER — OFFICE VISIT (OUTPATIENT)
Dept: UROLOGY | Facility: CLINIC | Age: 27
End: 2022-09-14

## 2022-09-14 VITALS
HEIGHT: 70 IN | BODY MASS INDEX: 32.35 KG/M2 | DIASTOLIC BLOOD PRESSURE: 75 MMHG | WEIGHT: 226 LBS | HEART RATE: 72 BPM | TEMPERATURE: 98.6 F | SYSTOLIC BLOOD PRESSURE: 119 MMHG

## 2022-09-14 DIAGNOSIS — R39.89 PNEUMATURIA: Primary | ICD-10-CM

## 2022-09-14 LAB
BILIRUB BLD-MCNC: NEGATIVE MG/DL
CLARITY, POC: CLEAR
COLOR UR: NORMAL
GLUCOSE UR STRIP-MCNC: NEGATIVE MG/DL
KETONES UR QL: NEGATIVE
LEUKOCYTE EST, POC: NEGATIVE
NITRITE UR-MCNC: NEGATIVE MG/ML
PH UR: 7.5 [PH] (ref 5–8)
PROT UR STRIP-MCNC: NEGATIVE MG/DL
RBC # UR STRIP: NEGATIVE /UL
SP GR UR: 1.03 (ref 1–1.03)
UROBILINOGEN UR QL: NORMAL

## 2022-09-14 PROCEDURE — 99212 OFFICE O/P EST SF 10 MIN: CPT | Performed by: UROLOGY

## 2022-09-14 NOTE — PROGRESS NOTES
"Chief Complaint  Pneumaturia    Bubbles in the urine    Subjective  No acute distress        Chris Gomez presents to [facility]  History of Present Illness    27-year-old white male has been having fever and was sick at home.  This was about few days ago but his COVID and strep test was negative.  1 time history of dysuria.  He has no gross hematuria.  No history of IBD.  He noticed that there were a lot of bubbles when he urinated in the toilet.  Patient did not pass any air through the penis.  No abdominal pain.    Objective No acute distress  Vital Signs:   /75   Pulse 72   Temp 98.6 °F (37 °C)   Ht 177.8 cm (70\")   Wt 103 kg (226 lb)   BMI 32.43 kg/m²     Allergies   Allergen Reactions   • Lac Bovis GI Intolerance   • Contrast Dye Rash      Past medical history:  has a past medical history of Asthma, Food poisoning, and Ingrown toenail of both feet.   Past surgical history:  has a past surgical history that includes Luna tooth extraction; Esophagogastroduodenoscopy (N/A, 08/02/2022); Colonoscopy (N/A, 08/02/2022); and Cystoscopy.  Personal history: family history includes Breast cancer (age of onset: 70) in an other family member; Diabetes in his maternal grandfather and maternal grandmother; Heart disease in an other family member; Kidney cancer in an other family member; Lung cancer in his father; Nephrolithiasis in his maternal uncle and paternal aunt; Stroke in an other family member.  Social history:  reports that he has never smoked. He has never used smokeless tobacco. He reports that he does not drink alcohol and does not use drugs.    Review of Systems    Please see past medical and surgical history and rest of the system is negative.    Physical Exam  Constitutional:       General: He is not in acute distress.     Appearance: Normal appearance. He is obese. He is not ill-appearing or toxic-appearing.   HENT:      Head: Normocephalic and atraumatic.      " Ears:      Comments: No hearing loss  Abdominal:      Palpations: Abdomen is soft. There is no mass.      Tenderness: There is no abdominal tenderness. There is no right CVA tenderness or left CVA tenderness.   Genitourinary:     Comments: Uncircumcised normal penis.    Right and left scrotum is normal.    Right and left testicle is normal.  Right and left epididymis is normal.  Musculoskeletal:         General: Normal range of motion.   Skin:     General: Skin is warm.      Coloration: Skin is not jaundiced.   Neurological:      General: No focal deficit present.      Mental Status: He is alert and oriented to person, place, and time.      Motor: No weakness.      Gait: Gait normal.   Psychiatric:         Mood and Affect: Mood normal.         Behavior: Behavior normal.         Thought Content: Thought content normal.         Judgment: Judgment normal.        Result Review :                 Assessment and Plan    Diagnoses and all orders for this visit:    1. Pneumaturia (Primary)  -     POC Urinalysis Dipstick      His urinalysis is clear and I really cannot find anything wrong with the patient.  We will be happy to see him on as needed basis.  Brief Urine Lab Results  (Last result in the past 365 days)      Color   Clarity   Blood   Leuk Est   Nitrite   Protein   CREAT   Urine HCG        09/14/22 1616 Dark Yellow   Clear   Negative   Negative   Negative   Negative                  Follow Up   No follow-ups on file.  Patient was given instructions and counseling regarding his condition or for health maintenance advice. Please see specific information pulled into the AVS if appropriate.     Gianni Aguirre MD

## 2022-09-26 ENCOUNTER — OFFICE VISIT (OUTPATIENT)
Dept: FAMILY MEDICINE CLINIC | Facility: CLINIC | Age: 27
End: 2022-09-26

## 2022-09-26 VITALS
WEIGHT: 220.2 LBS | DIASTOLIC BLOOD PRESSURE: 76 MMHG | BODY MASS INDEX: 31.52 KG/M2 | HEIGHT: 70 IN | HEART RATE: 72 BPM | OXYGEN SATURATION: 99 % | SYSTOLIC BLOOD PRESSURE: 110 MMHG

## 2022-09-26 DIAGNOSIS — R14.2 BELCHING: Primary | ICD-10-CM

## 2022-09-26 DIAGNOSIS — F41.8 ANXIETY ABOUT HEALTH: ICD-10-CM

## 2022-09-26 PROCEDURE — 99213 OFFICE O/P EST LOW 20 MIN: CPT | Performed by: PHYSICIAN ASSISTANT

## 2022-09-26 RX ORDER — SUCRALFATE 1 G/1
1 TABLET ORAL AS NEEDED
COMMUNITY
End: 2023-01-06

## 2022-09-26 NOTE — PROGRESS NOTES
Chief Complaint  Chief Complaint   Patient presents with   • Establish Care   • Gas       Subjective          Chris Gomez presents to [facility]  History of Present Illness     Patient is here today to establish care as a new patient. Patient's previous PCP is ANDREWS Esteban. Patient would also like to discuss his previous EGD and CLN with Dr. Cunningham; patient has follow up with GI in November.     Patient states that he has been belching a lot today; denies abdominal pain. Patient appears to be swallowing air then belching. The patient also states that he is concerned that his O2 was only 97 today, because in the past it has always been 100.     Patient admits to having paranoia about his health.    Medical History: has a past medical history of Asthma, Food poisoning, and Ingrown toenail of both feet.   Surgical History: has a past surgical history that includes Rose tooth extraction; Esophagogastroduodenoscopy (N/A, 08/02/2022); Colonoscopy (N/A, 08/02/2022); and Cystoscopy.   Family History: family history includes Breast cancer (age of onset: 70) in an other family member; Diabetes in his maternal grandfather and maternal grandmother; Heart disease in an other family member; Kidney cancer in an other family member; Lung cancer in his father; Nephrolithiasis in his maternal uncle and paternal aunt; Stroke in an other family member.   Social History: reports that he has never smoked. He has never used smokeless tobacco. He reports that he does not drink alcohol and does not use drugs.    Allergies: Lac bovis and Contrast dye    There are no preventive care reminders to display for this patient.    Immunization History   Administered Date(s) Administered   • DTP / HiB 06/05/1996   • DTaP, Unspecified 05/28/1999   • Hep B, Unspecified 03/20/1996   • MMR 06/05/1996, 05/28/1999   • OPV 05/28/1999   • Varicella 03/05/1997       Objective     Vitals:    09/26/22 0942 09/26/22  "1020   BP: 110/76    Pulse: 72    SpO2: 97% 99%   Weight: 99.9 kg (220 lb 3.2 oz)    Height: 177.8 cm (70\")      Body mass index is 31.6 kg/m².     Wt Readings from Last 3 Encounters:   09/26/22 99.9 kg (220 lb 3.2 oz)   09/14/22 103 kg (226 lb)   09/04/22 103 kg (226 lb 11.2 oz)     BP Readings from Last 3 Encounters:   09/26/22 110/76   09/14/22 119/75   09/04/22 114/65         Patient Care Team:  Charlene Chino PA as PCP - General (Family Medicine)    Physical Exam  Vitals and nursing note reviewed.   Constitutional:       Appearance: Normal appearance.   HENT:      Head: Normocephalic and atraumatic.   Neck:      Vascular: No carotid bruit.      Comments: Thyroid : gland size normal, nontender, no nodules or masses present on palpation   Cardiovascular:      Rate and Rhythm: Normal rate and regular rhythm.      Pulses: Normal pulses.      Heart sounds: Normal heart sounds.   Pulmonary:      Effort: Pulmonary effort is normal.      Breath sounds: Normal breath sounds.   Musculoskeletal:      Cervical back: Neck supple. No tenderness.      Right lower leg: No edema.      Left lower leg: No edema.   Lymphadenopathy:      Cervical: No cervical adenopathy.   Neurological:      Mental Status: He is alert.   Psychiatric:         Mood and Affect: Mood normal.         Behavior: Behavior normal.           Result Review :                           Assessment and Plan      Diagnoses and all orders for this visit:    1. Belching (Primary)  Comments:  Discussed aggravating factors like chewing gum, swallowing air, drinking with straw; advised to avoid those things.    2. Anxiety about health  Comments:  Discussed briefly at discharge; I think if symptoms persist, he made need a referral to Dr. Aguirre.        I spent 20 minutes caring for Chris on this date of service. This time includes time spent by me in the following activities:preparing for the visit, reviewing tests, obtaining and/or reviewing a separately " obtained history, performing a medically appropriate examination and/or evaluation , counseling and educating the patient/family/caregiver and documenting information in the medical record    Follow Up     Return if symptoms worsen or fail to improve.    Patient was given instructions and counseling regarding his condition or for health maintenance advice. Please see specific information pulled into the AVS if appropriate.

## 2023-01-04 NOTE — PROGRESS NOTES
Chief Complaint   3M endo follow up    History of Present Illness       Chris Gomez is a 27 y.o. male who presents to [facility] for follow-up after recent endoscopy.  We have reviewed colonoscopy and EGD as well as pathology.  Patient reports that he continues with dietary changes and has had improvement in his symptoms with resolution bloating gas production and reflux.  Patient denies fever, nausea, vomiting, weight loss, night sweats, melena, hematochezia, hematemesis.    Endoscopy: Review of the patient's most recent EGD and colonoscopy performed by Dr. Cunningham on 08.02.2022 normal.  Normal pathology.    Most recent labs on 01.21.2022 see below.  Results       Result Review :       CMP    CMP 1/21/22   Glucose 97   BUN 6   Creatinine 0.91   eGFR Non African Am 101   Sodium 141   Potassium 3.9   Chloride 104   Calcium 9.3   Total Protein 7.5   Albumin 4.20   Globulin 3.3   Total Bilirubin 0.3   Alkaline Phosphatase 116   AST (SGOT) 28   ALT (SGPT) 46 (A)   Albumin/Globulin Ratio 1.3   BUN/Creatinine Ratio 6.6 (A)   Anion Gap 8.7   (A) Abnormal value            CBC    CBC 1/21/22   WBC 6.20   RBC 4.86   Hemoglobin 14.6   Hematocrit 43.5   MCV 89.5   MCH 30.0   MCHC 33.6   RDW 12.3   Platelets 188                         Past Medical History       Past Medical History:   Diagnosis Date   • Asthma     as child   • Food poisoning    • Ingrown toenail of both feet     great toe both feet       Past Surgical History:   Procedure Laterality Date   • COLONOSCOPY N/A 08/02/2022    Procedure: COLONOSCOPY;  Surgeon: Giacomo Cunningham MD;  Location: formerly [facility] ENDOSCOPY;  Service: Gastroenterology;  Laterality: N/A;  NORMAL COLONOSCOPY   • CYSTOSCOPY     • ENDOSCOPY N/A 08/02/2022    Procedure: ESOPHAGOGASTRODUODENOSCOPY WITH BX;  Surgeon: Giacomo Cunningham MD;  Location: formerly [facility] ENDOSCOPY;  Service: Gastroenterology;  Laterality: N/A;  NORMAL EGD   • FEMUR FRACTURE SURGERY       ingrown toe nail   • WISDOM TOOTH EXTRACTION         No current outpatient medications on file.     Allergies   Allergen Reactions   • Lac Bovis GI Intolerance   • Contrast Dye Rash       Family History   Problem Relation Age of Onset   • Lung cancer Father         MALIGNANT   • Nephrolithiasis Maternal Uncle    • Nephrolithiasis Paternal Aunt    • Diabetes Maternal Grandmother    • Diabetes Maternal Grandfather    • Breast cancer Other 70   • Kidney cancer Other    • Heart disease Other    • Stroke Other    • Colon cancer Neg Hx    • Malig Hyperthermia Neg Hx         Social History     Social History Narrative   • Not on file       Objective     Vital Signs:   /73 (BP Location: Left arm, Patient Position: Sitting, Cuff Size: Adult)   Pulse 80   Ht 177.8 cm (70\")   Wt 101 kg (222 lb)   SpO2 100%   BMI 31.85 kg/m²       Physical Exam  Constitutional:       General: He is not in acute distress.     Appearance: Normal appearance. He is well-developed and normal weight.   Eyes:      Conjunctiva/sclera: Conjunctivae normal.      Pupils: Pupils are equal, round, and reactive to light.      Visual Fields: Right eye visual fields normal and left eye visual fields normal.   Cardiovascular:      Rate and Rhythm: Normal rate and regular rhythm.      Heart sounds: Normal heart sounds.   Pulmonary:      Effort: Pulmonary effort is normal. No retractions.      Breath sounds: Normal breath sounds and air entry.      Comments: Inspection of chest: normal appearance  Abdominal:      General: Bowel sounds are normal.      Palpations: Abdomen is soft.      Tenderness: There is no abdominal tenderness.      Comments: No appreciable hepatosplenomegaly   Musculoskeletal:      Cervical back: Neck supple.      Right lower leg: No edema.      Left lower leg: No edema.   Lymphadenopathy:      Cervical: No cervical adenopathy.   Skin:     Findings: No lesion.      Comments: Turgor normal   Neurological:      Mental Status: He is  alert and oriented to person, place, and time.   Psychiatric:         Mood and Affect: Mood and affect normal.           Assessment & Plan          Assessment and Plan    Diagnoses and all orders for this visit:    1. Celiac disease (Primary)    2. Abdominal bloating    3. Gastroesophageal reflux disease, unspecified whether esophagitis present    4. History of cow's milk protein sensitivity      27-year-old male presenting the office today  for follow-up after recent endoscopy.  We have reviewed colonoscopy and EGD as well as pathology.  Patient has had improvement of his symptoms with continued dietary changes.  Patient will follow-up on an as-needed basis.  Patient agreeable to this plan will call with any questions or concerns.          Follow Up       Follow Up   Return if symptoms worsen or fail to improve.  Patient was given instructions and counseling regarding his condition or for health maintenance advice. Please see specific information pulled into the AVS if appropriate.

## 2023-01-06 ENCOUNTER — OFFICE VISIT (OUTPATIENT)
Dept: GASTROENTEROLOGY | Facility: CLINIC | Age: 28
End: 2023-01-06
Payer: MEDICAID

## 2023-01-06 VITALS
DIASTOLIC BLOOD PRESSURE: 73 MMHG | SYSTOLIC BLOOD PRESSURE: 118 MMHG | HEIGHT: 70 IN | HEART RATE: 80 BPM | WEIGHT: 222 LBS | BODY MASS INDEX: 31.78 KG/M2 | OXYGEN SATURATION: 100 %

## 2023-01-06 DIAGNOSIS — Z91.011 HISTORY OF COW'S MILK PROTEIN SENSITIVITY: ICD-10-CM

## 2023-01-06 DIAGNOSIS — R14.0 ABDOMINAL BLOATING: ICD-10-CM

## 2023-01-06 DIAGNOSIS — K90.0 CELIAC DISEASE: Primary | ICD-10-CM

## 2023-01-06 DIAGNOSIS — K21.9 GASTROESOPHAGEAL REFLUX DISEASE, UNSPECIFIED WHETHER ESOPHAGITIS PRESENT: ICD-10-CM

## 2023-01-06 PROCEDURE — 99213 OFFICE O/P EST LOW 20 MIN: CPT | Performed by: NURSE PRACTITIONER

## 2023-09-14 PROBLEM — K62.5 RECTAL BLEEDING: Status: RESOLVED | Noted: 2022-05-16 | Resolved: 2023-09-14

## 2023-09-14 PROBLEM — R35.0 URINARY FREQUENCY: Status: RESOLVED | Noted: 2022-05-06 | Resolved: 2023-09-14

## 2023-09-14 PROBLEM — R23.8 SKIN IRRITATION: Status: RESOLVED | Noted: 2022-05-06 | Resolved: 2023-09-14

## 2023-09-14 PROBLEM — Z00.00 WELL ADULT EXAM: Status: ACTIVE | Noted: 2023-09-14

## 2023-09-14 PROBLEM — J45.909 ASTHMA: Status: RESOLVED | Noted: 2021-06-13 | Resolved: 2023-09-14

## 2023-09-14 PROBLEM — Z78.9 UNCIRCUMCISED MALE: Status: RESOLVED | Noted: 2022-05-06 | Resolved: 2023-09-14

## 2023-09-14 PROBLEM — R14.0 ABDOMINAL BLOATING: Status: RESOLVED | Noted: 2022-05-16 | Resolved: 2023-09-14

## 2023-09-14 PROBLEM — R11.0 NAUSEA: Status: RESOLVED | Noted: 2022-05-16 | Resolved: 2023-09-14

## 2023-09-14 PROBLEM — R14.2 BELCHING: Status: RESOLVED | Noted: 2022-05-16 | Resolved: 2023-09-14

## 2023-09-14 PROBLEM — N48.1 BALANITIS: Status: RESOLVED | Noted: 2022-05-06 | Resolved: 2023-09-14

## 2023-09-14 NOTE — PROGRESS NOTES
"Chief Complaint  Establish Care (29 y/o male is here today to establish care. Patient wants to discuss tonsil stones, he also wants to discuss urine issues that he is having. Patient wants to get his ears checked, he states whenever he clean his ears it looks like the wax is bloody. No other issues at this time.)    Subjective      Chris Gomez presents to [facility]    History of Present Illness  Patient 28-year-old male with celiac disease, reflux, microscopic hematuria, obesity, among others, who is being seen 9/23 for New Patient appointment.  We will review his med list, go over any recent labs and management of pain, address his care gaps, and make further recommendations at that time.    Review of Systems   Constitutional:  Negative for appetite change, fatigue and fever.   HENT:  Negative for congestion and ear pain.    Eyes:  Negative for blurred vision.   Respiratory:  Negative for cough, chest tightness, shortness of breath and wheezing.    Cardiovascular:  Negative for chest pain, palpitations and leg swelling.   Gastrointestinal:  Negative for abdominal pain.   Genitourinary:  Negative for difficulty urinating, dysuria and hematuria.   Musculoskeletal:  Negative for arthralgias and gait problem.   Skin:  Negative for skin lesions.   Neurological:  Negative for syncope, memory problem and confusion.   Psychiatric/Behavioral:  Negative for self-injury and depressed mood.      Objective   Vital Signs:   /82 (BP Location: Left arm, Patient Position: Sitting, Cuff Size: Adult)   Pulse 84   Temp 98.4 °F (36.9 °C) (Temporal)   Resp 18   Ht 177.8 cm (70\")   Wt 101 kg (223 lb 9.6 oz)   SpO2 97%   BMI 32.08 kg/m²           Physical Exam  Vitals and nursing note reviewed.   Constitutional:       General: He is not in acute distress.     Appearance: Normal appearance. He is not toxic-appearing.   HENT:      Head: Atraumatic.      Right Ear: External ear " normal.      Left Ear: External ear normal.      Nose: Nose normal.      Mouth/Throat:      Mouth: Mucous membranes are moist.   Eyes:      General:         Right eye: No discharge.         Left eye: No discharge.      Extraocular Movements: Extraocular movements intact.      Pupils: Pupils are equal, round, and reactive to light.   Cardiovascular:      Rate and Rhythm: Normal rate and regular rhythm.      Pulses: Normal pulses.      Heart sounds: Normal heart sounds. No murmur heard.    No gallop.   Pulmonary:      Effort: Pulmonary effort is normal. No respiratory distress.      Breath sounds: No wheezing, rhonchi or rales.   Abdominal:      General: There is no distension.      Palpations: Abdomen is soft. There is no mass.      Tenderness: There is no abdominal tenderness. There is no guarding.   Musculoskeletal:         General: No swelling or tenderness.      Cervical back: No tenderness.      Right lower leg: No edema.      Left lower leg: No edema.   Skin:     General: Skin is warm and dry.      Findings: No rash.   Neurological:      General: No focal deficit present.      Mental Status: He is alert and oriented to person, place, and time. Mental status is at baseline.      Motor: No weakness.      Gait: Gait normal.   Psychiatric:         Mood and Affect: Mood normal.         Thought Content: Thought content normal.        Result Review   The following data was reviewed by: Bentley Be MD on 09/15/2023:  [] Laboratory  [] Microbiology  [] Radiology  [] EKG/telemetry  [] Cardiology/Vascular  [] Pathology  [] Old records             Assessment and Plan   Diagnoses and all orders for this visit:    1. Well adult exam (Primary)  Overview:  Preventive measures: were reviewed with the patient at this office visit.  They included but were not limited to discussions in regards to vaccines outstanding, auto safety with seat belts and other assistive devices, fall prevention, and routine screening  studies.    Exercise: No ischemic symptoms with walks, jobs around the house, and yard work etc.  Comprehensive labs: All needed.    Covid vaccine: Undecided.  Other vaccines: Consider Tdap.    PSA: not yet (Neg FH).  Colon: not yet (Neg FH).    SH: Single, non-smoker, no etoh, odd jobs occ.,   FH: F passed lung ca age 63, M Pat, 1/2 S no contact, no colon ca.    Orders:  -     CBC & Differential; Future  -     Comprehensive Metabolic Panel; Future  -     Lipid Panel; Future  -     Testosterone; Future  -     TSH+Free T4; Future  -     Urinalysis With Culture If Indicated -; Future    2. Class 1 obesity due to excess calories with serious comorbidity and body mass index (BMI) of 32.0 to 32.9 in adult  Overview:  Liver U/S 3/21:  INDICATIONS: RUQ PAIN,N/V/BELCHING           FINDINGS:     LIVER: Normal.  Normal size and echotexture.  No significant masses.    BILIARY: Normal.  Normal appearing gallbladder and biliary tree.  Common bile duct is 3 mm    PANCREAS: Normal.  No visible mass, abnormal atrophy, or ductal dilatation.      RIGHT KIDNEY: Normal.  No mass or hydronephrosis.  10.4 cm in length     OTHER: Negative.           CONCLUSION: Normal      Assessment & Plan:  Patient's (Body mass index is 32.08 kg/m².) indicates that they are obese (BMI >30) with health conditions that include none . Weight is unchanged. BMI  is above average; BMI management plan is completed. We discussed portion control and increasing exercise.       3. Tonsil stone  Assessment & Plan:  This is been an issue for some time, they get large on him, has some discomfort in his neck periodically.  He was concerned about potentially impacting his carotid artery, discussed with patient that not possible, but they could get infected, so certainly need to keep an eye on them.      4. Prostate cancer screening  -     PSA Screen; Future    5. Vitamin D deficiency  -     Vitamin D,25-Hydroxy; Future        BMI is >= 30 and <35. (Class 1 Obesity).  The following options were offered after discussion;: exercise counseling/recommendations and nutrition counseling/recommendations            Follow Up   Return in about 6 months (around 3/15/2024).  Patient was given instructions and counseling regarding his condition or for health maintenance advice. Please see specific information pulled into the AVS if appropriate.     Total Time Spent:   minutes     This time includes time spent by me in the following activities: preparing for the visit, reviewing extensive past medical history and tests, performing a medically appropriate examination and/or evaluation, counseling and educating the patient and/or caregivers, ordering medications, tests, or procedures, referring and/or communicating with other health care professionals and documenting information in the medical record all on this date of service.

## 2023-09-15 ENCOUNTER — OFFICE VISIT (OUTPATIENT)
Dept: INTERNAL MEDICINE | Facility: CLINIC | Age: 28
End: 2023-09-15
Payer: MEDICAID

## 2023-09-15 VITALS
HEART RATE: 84 BPM | TEMPERATURE: 98.4 F | OXYGEN SATURATION: 97 % | HEIGHT: 70 IN | SYSTOLIC BLOOD PRESSURE: 120 MMHG | BODY MASS INDEX: 32.01 KG/M2 | WEIGHT: 223.6 LBS | DIASTOLIC BLOOD PRESSURE: 82 MMHG | RESPIRATION RATE: 18 BRPM

## 2023-09-15 DIAGNOSIS — J35.8 TONSIL STONE: ICD-10-CM

## 2023-09-15 DIAGNOSIS — Z12.5 PROSTATE CANCER SCREENING: ICD-10-CM

## 2023-09-15 DIAGNOSIS — E55.9 VITAMIN D DEFICIENCY: ICD-10-CM

## 2023-09-15 DIAGNOSIS — E66.09 CLASS 1 OBESITY DUE TO EXCESS CALORIES WITH SERIOUS COMORBIDITY AND BODY MASS INDEX (BMI) OF 32.0 TO 32.9 IN ADULT: ICD-10-CM

## 2023-09-15 DIAGNOSIS — Z00.00 WELL ADULT EXAM: Primary | ICD-10-CM

## 2023-09-15 LAB
BILIRUB UR QL STRIP: NEGATIVE
CLARITY UR: CLEAR
COLOR UR: YELLOW
GLUCOSE UR STRIP-MCNC: NEGATIVE MG/DL
HGB UR QL STRIP.AUTO: NEGATIVE
KETONES UR QL STRIP: NEGATIVE
LEUKOCYTE ESTERASE UR QL STRIP.AUTO: NEGATIVE
NITRITE UR QL STRIP: NEGATIVE
PH UR STRIP.AUTO: 6.5 [PH] (ref 5–8)
PROT UR QL STRIP: NEGATIVE
SP GR UR STRIP: 1.01 (ref 1–1.03)
UROBILINOGEN UR QL STRIP: NORMAL

## 2023-09-15 PROCEDURE — 81003 URINALYSIS AUTO W/O SCOPE: CPT | Performed by: INTERNAL MEDICINE

## 2023-09-15 RX ORDER — DIPHENHYDRAMINE HCL 25 MG
25 CAPSULE ORAL AS NEEDED
COMMUNITY

## 2023-09-15 NOTE — ASSESSMENT & PLAN NOTE
I reviewed his colonoscopy from 8/22 with results as noted above.  He was evaluated Dr. Cunningham's office, he was diagnosed with celiac disease.  He knows some of the foods to avoid, and we will print off additional information for him.

## 2023-09-15 NOTE — PATIENT INSTRUCTIONS
"You were diagnosed with celiac disease, you need to follow a gluten-free diet, there is information below to this regards:    Gluten-Free Diet for Celiac Disease, Adult    The gluten-free diet includes all foods that do not contain gluten. Gluten is a protein that is found in wheat, rye, barley, and some other grains. Following the gluten-free diet is the only treatment for people with celiac disease. It helps to prevent damage to the intestines and improves or eliminates the symptoms of celiac disease.  Following the gluten-free diet requires some planning. It can be challenging at first, but it gets easier with time and practice. There are more gluten-free options available today than ever before. If you need help finding gluten-free foods or if you have questions, talk with your dietitian or health care provider.  What are tips for following this plan?    Reading food labels  Read all food labels. Gluten is often added to foods. Always check the ingredient list and look for warnings that the food may contain gluten. Foods that list any of these key words on the label usually contain gluten:  Wheat, flour, enriched flour, bromated flour, white flour, durum flour, amador flour, phosphated flour, self-rising flour, semolina, farina, barley (malt), rye, and oats.  Starch, dextrin, modified food starch, or cereal.  Thickening, fillers, or emulsifiers.  Malt flavoring, malt extract, or malt syrup.  Hydrolyzed vegetable protein.  In the [place] packaged foods that are gluten-free are required to be labeled \"GF.\" These foods should be easy to identify and are safe to eat. In the [place] food companies are also required to list common food allergens, including wheat, on their labels.    Shopping  When grocery shopping, start in the produce, meat, and dairy sections. These areas are more likely to contain gluten-free foods. Then move to the aisles that contain packaged foods if you need to.    Meal planning  All fruits, " vegetables, and meats are safe to eat and do not contain gluten.  Talk with your dietitian or health care provider before taking a gluten-free multivitamin or mineral supplement.  Be aware of gluten-free foods having contact with foods that contain gluten (cross-contamination). This can happen at home and with any processed foods.  Talk with your health care provider or dietitian about how to reduce the risk of cross-contamination in your home.  If you have questions about how a food is processed, ask the .    What foods can I eat?    Fruits  All plain fresh, frozen, canned, and dried fruits, and 100% fruit juices.  Vegetables  All plain fresh, frozen, and canned vegetables, and 100% vegetable juices.  Grains  Amaranth, bean flours, 100% buckwheat flour, corn, millet, nut flours or nut meals, GF oats, quinoa, rice, sorghum, teff, rice wafers, pure cornmeal tortillas, popcorn, and hot cereals made from cornmeal or GF grains.  Cassandra, rice, and wild rice. Some Asian rice noodles or bean noodles.  Arrowroot starch, corn bran, corn flour, corn germ, cornmeal, corn starch, potato flour, potato starch flour, and rice bran. Plain, brown, and sweet rice flours. Rice polish, soy flour, and tapioca starch.  Meats and other protein foods  All fresh beef, pork, poultry, fish, seafood, and eggs. Fish canned in water, oil, brine, or vegetable broth. Plain nuts and seeds, peanut butter.  Some precooked or cured meat, such as sausages or meat loaves. Some frankfurters. Dried beans, dried peas, and lentils.  Dairy  Fresh plain, dry, evaporated, or condensed milk. Cream, butter, sour cream, whipping cream, and most yogurts. Unprocessed cheese, most processed cheeses, some cottage cheeses, and some cream cheeses.  Beverages  Coffee, tea, and most herbal teas. Carbonated beverages and some root beers. Wine, sake, and pure distilled spirits, such as gin, vodka, and whiskey. Most hard ciders.  Fats and oils  Butter,  margarine, vegetable oil, hydrogenated butter, olive oil, shortening, lard, cream, and some mayonnaise. Some commercial salad dressings. Olives.  Sweets and desserts  Sugar, honey, some syrups, molasses, jelly, and jam. Plain hard candy, marshmallows, and gumdrops.  Pure cocoa powder. Plain chocolate. Custard and some pudding mixes. Gelatin desserts, sorbets, frozen ice pops, and sherbet.  Cake, cookies, and other desserts prepared with allowed flours. Some commercial ice creams. Cornstarch, tapioca, and rice puddings.  Seasoning and other foods  Some canned or frozen soups. Monosodium glutamate (MSG). Cider, rice, and wine vinegar. Baking soda and baking powder.  Cream of tartar. Baking and nutritional yeast. Certain soy sauces made without wheat. Ask your dietitian about specific brands that are allowed.  Nuts, coconut, and chocolate. Salt, pepper, herbs, spices, flavoring extracts, imitation or artificial flavorings, natural flavorings, and food colorings.  Some medicines and supplements. Rice syrups.  The items listed above may not be a complete list of foods and beverages you can eat and drink. Contact a dietitian for more information.  What foods should I avoid?  Fruits  Thickened or prepared fruits and some pie fillings. Some fruit snacks and fruit roll-ups.  Vegetables  Most creamed vegetables and most vegetables canned in sauces. Some commercially prepared vegetables and salads. Vegetables in a soy sauce marinade or dressing.  Grains  Barley, bran, bulgur, couscous, cracked wheat, Jacobs, farro, amador, malt, matzo, semolina, wheat germ, and all wheat and rye cereals, including spelt and kamut.  Cereals containing malt as a flavoring, such as rice cereal. Noodles, spaghetti, macaroni, most packaged rice mixes, and all mixes containing wheat, rye, barley, or triticale.  Meats and other protein foods  Any meat or meat alternative containing wheat, rye, barley, or gluten stabilizers. These are often marinated  or packaged meats, and precooked or cured meat, such as sausages or meat loaves.  Bread-containing products, such as Swiss steak, croquettes, meatballs, and meatloaf. Most tuna canned in vegetable broth. Turkey with hydrolyzed vegetable protein (HVP) injected as part of the basting.  Seitan. Imitation fish. Eggs in sauces made from ingredients to avoid.  Dairy  Commercial chocolate milk drinks and malted milk. Some non-dairy creamers. Any cheese product containing ingredients to avoid.  Beverages  Certain cereal beverages. Beer, nunu, malted milk, and some root beers. Some hard ciders. Some instant flavored coffees. Some herbal teas made with barley or with barley malt added.  Fats and oils  Some commercial salad dressings. Sour cream containing modified food starch.  Sweets and desserts  Some toffees. Chocolate-coated nuts (may be rolled in wheat flour) and some commercial candies and candy bars.  Most cakes, cookies, donuts, pastries, and other baked goods. Some commercial ice cream. Ice cream cones.  Commercially prepared mixes for cakes, cookies, and other desserts. Bread pudding and other puddings thickened with flour.  Products containing brown rice syrup made with barley malt enzyme. Desserts and sweets made with malt flavoring.  Seasoning and other foods  Some devlin powders, some dry seasoning mixes, some gravy extracts, some meat sauces, some ketchups, some prepared mustards, and horseradish.  Certain soy sauces. Malt vinegar. Bouillon and bouillon cubes that contain HVP. Some chip dips. Some chewing gum.  Yeast extract. Bull's yeast. Caramel color.  Some medicines and supplements.  The items listed above may not be a complete list of foods and beverages you should avoid. Contact a dietitian for more information.    Summary  Gluten is a protein that is found in wheat, rye, barley, and some other grains. The gluten-free diet includes all foods that do not contain gluten.  If you need help finding  gluten-free foods or if you have questions, talk with your dietitian or your health care provider.  Read all food labels. Gluten is often added to foods. Always check the ingredient list and look for warnings that the food may contain gluten.  This information is not intended to replace advice given to you by your health care provider. Make sure you discuss any questions you have with your health care provider.  Document Revised: 11/08/2022 Document Reviewed: 11/08/2022  Elsevier Patient Education © 2023 [employer]

## 2023-09-15 NOTE — ASSESSMENT & PLAN NOTE
This is been an issue for some time, they get large on him, has some discomfort in his neck periodically.  He was concerned about potentially impacting his carotid artery, discussed with patient that not possible, but they could get infected, so certainly need to keep an eye on them.

## 2023-09-15 NOTE — ASSESSMENT & PLAN NOTE
Patient's (Body mass index is 32.08 kg/m².) indicates that they are obese (BMI >30) with health conditions that include none . Weight is unchanged. BMI  is above average; BMI management plan is completed. We discussed portion control and increasing exercise.

## 2023-10-27 ENCOUNTER — LAB (OUTPATIENT)
Dept: LAB | Facility: HOSPITAL | Age: 28
End: 2023-10-27
Payer: MEDICAID

## 2023-10-27 DIAGNOSIS — Z12.5 PROSTATE CANCER SCREENING: ICD-10-CM

## 2023-10-27 DIAGNOSIS — E55.9 VITAMIN D DEFICIENCY: ICD-10-CM

## 2023-10-27 DIAGNOSIS — Z00.00 WELL ADULT EXAM: ICD-10-CM

## 2023-10-27 LAB
25(OH)D3 SERPL-MCNC: 12.4 NG/ML (ref 30–100)
ALBUMIN SERPL-MCNC: 4.3 G/DL (ref 3.5–5.2)
ALBUMIN/GLOB SERPL: 1.6 G/DL
ALP SERPL-CCNC: 93 U/L (ref 39–117)
ALT SERPL W P-5'-P-CCNC: 40 U/L (ref 1–41)
ANION GAP SERPL CALCULATED.3IONS-SCNC: 9 MMOL/L (ref 5–15)
AST SERPL-CCNC: 30 U/L (ref 1–40)
BASOPHILS # BLD AUTO: 0.05 10*3/MM3 (ref 0–0.2)
BASOPHILS NFR BLD AUTO: 0.8 % (ref 0–1.5)
BILIRUB SERPL-MCNC: 0.6 MG/DL (ref 0–1.2)
BUN SERPL-MCNC: 8 MG/DL (ref 6–20)
BUN/CREAT SERPL: 7.8 (ref 7–25)
CALCIUM SPEC-SCNC: 9.2 MG/DL (ref 8.6–10.5)
CHLORIDE SERPL-SCNC: 105 MMOL/L (ref 98–107)
CHOLEST SERPL-MCNC: 184 MG/DL (ref 0–200)
CO2 SERPL-SCNC: 25 MMOL/L (ref 22–29)
CREAT SERPL-MCNC: 1.02 MG/DL (ref 0.76–1.27)
DEPRECATED RDW RBC AUTO: 38.6 FL (ref 37–54)
EGFRCR SERPLBLD CKD-EPI 2021: 102.7 ML/MIN/1.73
EOSINOPHIL # BLD AUTO: 0.09 10*3/MM3 (ref 0–0.4)
EOSINOPHIL NFR BLD AUTO: 1.5 % (ref 0.3–6.2)
ERYTHROCYTE [DISTWIDTH] IN BLOOD BY AUTOMATED COUNT: 12.4 % (ref 12.3–15.4)
GLOBULIN UR ELPH-MCNC: 2.7 GM/DL
GLUCOSE SERPL-MCNC: 88 MG/DL (ref 65–99)
HCT VFR BLD AUTO: 39 % (ref 37.5–51)
HDLC SERPL-MCNC: 35 MG/DL (ref 40–60)
HGB BLD-MCNC: 13.3 G/DL (ref 13–17.7)
IMM GRANULOCYTES # BLD AUTO: 0.01 10*3/MM3 (ref 0–0.05)
IMM GRANULOCYTES NFR BLD AUTO: 0.2 % (ref 0–0.5)
LDLC SERPL CALC-MCNC: 131 MG/DL (ref 0–100)
LDLC/HDLC SERPL: 3.69 {RATIO}
LYMPHOCYTES # BLD AUTO: 2.53 10*3/MM3 (ref 0.7–3.1)
LYMPHOCYTES NFR BLD AUTO: 41.3 % (ref 19.6–45.3)
MCH RBC QN AUTO: 29.2 PG (ref 26.6–33)
MCHC RBC AUTO-ENTMCNC: 34.1 G/DL (ref 31.5–35.7)
MCV RBC AUTO: 85.7 FL (ref 79–97)
MONOCYTES # BLD AUTO: 0.45 10*3/MM3 (ref 0.1–0.9)
MONOCYTES NFR BLD AUTO: 7.3 % (ref 5–12)
NEUTROPHILS NFR BLD AUTO: 3 10*3/MM3 (ref 1.7–7)
NEUTROPHILS NFR BLD AUTO: 48.9 % (ref 42.7–76)
NRBC BLD AUTO-RTO: 0 /100 WBC (ref 0–0.2)
PLATELET # BLD AUTO: 190 10*3/MM3 (ref 140–450)
PMV BLD AUTO: 11.3 FL (ref 6–12)
POTASSIUM SERPL-SCNC: 3.9 MMOL/L (ref 3.5–5.2)
PROT SERPL-MCNC: 7 G/DL (ref 6–8.5)
PSA SERPL-MCNC: 0.34 NG/ML (ref 0–4)
RBC # BLD AUTO: 4.55 10*6/MM3 (ref 4.14–5.8)
SODIUM SERPL-SCNC: 139 MMOL/L (ref 136–145)
T4 FREE SERPL-MCNC: 1.09 NG/DL (ref 0.93–1.7)
TESTOST SERPL-MCNC: 357 NG/DL (ref 249–836)
TRIGL SERPL-MCNC: 100 MG/DL (ref 0–150)
TSH SERPL DL<=0.05 MIU/L-ACNC: 4.01 UIU/ML (ref 0.27–4.2)
VLDLC SERPL-MCNC: 18 MG/DL (ref 5–40)
WBC NRBC COR # BLD: 6.13 10*3/MM3 (ref 3.4–10.8)

## 2023-10-27 PROCEDURE — 82306 VITAMIN D 25 HYDROXY: CPT

## 2023-10-27 PROCEDURE — 80050 GENERAL HEALTH PANEL: CPT

## 2023-10-27 PROCEDURE — 80061 LIPID PANEL: CPT

## 2023-10-27 PROCEDURE — 84403 ASSAY OF TOTAL TESTOSTERONE: CPT

## 2023-10-27 PROCEDURE — G0103 PSA SCREENING: HCPCS

## 2023-10-27 PROCEDURE — 84439 ASSAY OF FREE THYROXINE: CPT

## 2023-10-27 PROCEDURE — 36415 COLL VENOUS BLD VENIPUNCTURE: CPT

## 2024-03-18 ENCOUNTER — OFFICE VISIT (OUTPATIENT)
Dept: INTERNAL MEDICINE | Age: 29
End: 2024-03-18
Payer: MEDICAID

## 2024-03-18 VITALS
SYSTOLIC BLOOD PRESSURE: 113 MMHG | OXYGEN SATURATION: 96 % | HEART RATE: 77 BPM | HEIGHT: 70 IN | TEMPERATURE: 97.8 F | BODY MASS INDEX: 31.64 KG/M2 | DIASTOLIC BLOOD PRESSURE: 77 MMHG | WEIGHT: 221 LBS

## 2024-03-18 DIAGNOSIS — Z00.00 WELL ADULT HEALTH CHECK: ICD-10-CM

## 2024-03-18 DIAGNOSIS — E55.9 VITAMIN D DEFICIENCY: ICD-10-CM

## 2024-03-18 DIAGNOSIS — E78.2 MIXED HYPERLIPIDEMIA: Primary | ICD-10-CM

## 2024-03-18 NOTE — PATIENT INSTRUCTIONS
Recommend you get vitamin D3 2000 international units over-the-counter and take this once a day.    2.  Additionally have fasting blood test to do just a few days before your 6-month appointment follow-up of the cholesterol and vitamin D.

## 2024-03-18 NOTE — ASSESSMENT & PLAN NOTE
LDL was 131 as per his 10/23 labs.  Patient has no other cardiac risk factors, blood pressure is excellent, sugar was only 88.  Just recommend patient watch the fats in his diet, certainly no indication for medication.

## 2024-03-18 NOTE — PROGRESS NOTES
Chief Complaint  6 month follow up (Last labs completed in October 2023/No medication Rfs requested today), Urinary Tract Infection (Possible s/s uti - pain in the lower lumbar / Rt kidney region.), Eczema (Dry hands & scalp), and Insomnia (Difficulty sleeping at night, yet very drowsy throughout the day)    Subjective      Chris Gomez presents to [facility]    Urinary Tract Infection   Pertinent negatives include no hematuria.   Eczema  Pertinent negatives include no abdominal pain, arthralgias, chest pain, congestion, coughing, fatigue or fever.   Insomnia  Pertinent negatives include no abdominal pain, arthralgias, chest pain, congestion, coughing, fatigue or fever.     History of present illness:  Patient 28-year-old male with celiac disease, reflux, microscopic hematuria, obesity, among others, seen 9/23 for New Patient appointment, who is coming in now 3/18/2024 for 6-month follow-up.  We will review his med list, go over any recent labs and management of pain, address his care gaps, and make further recommendations at that time.    Review of Systems   Constitutional:  Negative for appetite change, fatigue and fever.   HENT:  Negative for congestion and ear pain.    Eyes:  Negative for blurred vision.   Respiratory:  Negative for cough, chest tightness, shortness of breath and wheezing.    Cardiovascular:  Negative for chest pain, palpitations and leg swelling.   Gastrointestinal:  Negative for abdominal pain.   Genitourinary:  Negative for difficulty urinating, dysuria and hematuria.   Musculoskeletal:  Negative for arthralgias and gait problem.   Skin:  Negative for skin lesions.   Neurological:  Negative for syncope, memory problem and confusion.   Psychiatric/Behavioral:  Negative for self-injury and depressed mood. The patient has insomnia.        Objective   Vital Signs:   /77 (BP Location: Left arm, Patient Position: Sitting)   Pulse 77   Temp 97.8 °F  "(36.6 °C) (Temporal)   Ht 177.8 cm (70\")   Wt 100 kg (221 lb)   SpO2 96%   BMI 31.71 kg/m²           Physical Exam  Vitals and nursing note reviewed.   Constitutional:       General: He is not in acute distress.     Appearance: Normal appearance. He is not toxic-appearing.   HENT:      Head: Atraumatic.      Right Ear: External ear normal.      Left Ear: External ear normal.      Nose: Nose normal.      Mouth/Throat:      Mouth: Mucous membranes are moist.   Eyes:      General:         Right eye: No discharge.         Left eye: No discharge.      Extraocular Movements: Extraocular movements intact.      Pupils: Pupils are equal, round, and reactive to light.   Cardiovascular:      Rate and Rhythm: Normal rate and regular rhythm.      Pulses: Normal pulses.      Heart sounds: Normal heart sounds. No murmur heard.     No gallop.   Pulmonary:      Effort: Pulmonary effort is normal. No respiratory distress.      Breath sounds: No wheezing, rhonchi or rales.   Abdominal:      General: There is no distension.      Palpations: Abdomen is soft. There is no mass.      Tenderness: There is no abdominal tenderness. There is no guarding.   Musculoskeletal:         General: No swelling or tenderness.      Cervical back: No tenderness.      Right lower leg: No edema.      Left lower leg: No edema.   Skin:     General: Skin is warm and dry.      Findings: No rash.   Neurological:      General: No focal deficit present.      Mental Status: He is alert and oriented to person, place, and time. Mental status is at baseline.      Motor: No weakness.      Gait: Gait normal.   Psychiatric:         Mood and Affect: Mood normal.         Thought Content: Thought content normal.          Result Review   The following data was reviewed by: Bentley Be MD on 09/15/2023:  [] Laboratory  [] Microbiology  [] Radiology  [] EKG/telemetry  [] Cardiology/Vascular  [] Pathology  [] Old records             Assessment and Plan   Diagnoses and all " orders for this visit:    1. Mixed hyperlipidemia (Primary)  Assessment & Plan:  LDL was 131 as per his 10/23 labs.  Patient has no other cardiac risk factors, blood pressure is excellent, sugar was only 88.  Just recommend patient watch the fats in his diet, certainly no indication for medication.    Orders:  -     Comprehensive Metabolic Panel; Future  -     Lipid Panel; Future    2. Vitamin D deficiency  Assessment & Plan:  Vitamin D level is only 12, recommend 2000 international units over-the-counter daily.    Orders:  -     Vitamin D,25-Hydroxy; Future    3. Well adult health check  -     CBC & Differential; Future  -     TSH+Free T4; Future  -     Hemoglobin A1c; Future          BMI is >= 30 and <35. (Class 1 Obesity). The following options were offered after discussion;: exercise counseling/recommendations and nutrition counseling/recommendations            Follow Up   Return in about 6 months (around 9/18/2024).  Patient was given instructions and counseling regarding his condition or for health maintenance advice. Please see specific information pulled into the AVS if appropriate.     Total Time Spent:   minutes     This time includes time spent by me in the following activities: preparing for the visit, reviewing extensive past medical history and tests, performing a medically appropriate examination and/or evaluation, counseling and educating the patient and/or caregivers, ordering medications, tests, or procedures, referring and/or communicating with other health care professionals and documenting information in the medical record all on this date of service.

## 2024-09-18 ENCOUNTER — OFFICE VISIT (OUTPATIENT)
Dept: INTERNAL MEDICINE | Age: 29
End: 2024-09-18
Payer: MEDICAID

## 2024-09-18 VITALS
WEIGHT: 211.8 LBS | OXYGEN SATURATION: 98 % | TEMPERATURE: 97.1 F | HEIGHT: 70 IN | DIASTOLIC BLOOD PRESSURE: 60 MMHG | BODY MASS INDEX: 30.32 KG/M2 | SYSTOLIC BLOOD PRESSURE: 100 MMHG | HEART RATE: 91 BPM

## 2024-09-18 DIAGNOSIS — E78.2 MIXED HYPERLIPIDEMIA: ICD-10-CM

## 2024-09-18 DIAGNOSIS — E55.9 VITAMIN D DEFICIENCY: ICD-10-CM

## 2024-09-18 DIAGNOSIS — Z00.00 WELL ADULT EXAM: Primary | ICD-10-CM

## 2024-09-18 DIAGNOSIS — E03.8 SUBCLINICAL HYPOTHYROIDISM: ICD-10-CM

## 2024-09-18 LAB
25(OH)D3 SERPL-MCNC: 13.2 NG/ML (ref 30–100)
ALBUMIN SERPL-MCNC: 4.8 G/DL (ref 3.5–5.2)
ALBUMIN/GLOB SERPL: 1.5 G/DL
ALP SERPL-CCNC: 119 U/L (ref 39–117)
ALT SERPL W P-5'-P-CCNC: 35 U/L (ref 1–41)
ANION GAP SERPL CALCULATED.3IONS-SCNC: 10.9 MMOL/L (ref 5–15)
AST SERPL-CCNC: 27 U/L (ref 1–40)
BILIRUB SERPL-MCNC: 0.5 MG/DL (ref 0–1.2)
BUN SERPL-MCNC: 6 MG/DL (ref 6–20)
BUN/CREAT SERPL: 5.5 (ref 7–25)
CALCIUM SPEC-SCNC: 9.9 MG/DL (ref 8.6–10.5)
CHLORIDE SERPL-SCNC: 102 MMOL/L (ref 98–107)
CHOLEST SERPL-MCNC: 180 MG/DL (ref 0–200)
CO2 SERPL-SCNC: 24.1 MMOL/L (ref 22–29)
CREAT SERPL-MCNC: 1.09 MG/DL (ref 0.76–1.27)
EGFRCR SERPLBLD CKD-EPI 2021: 94.2 ML/MIN/1.73
GLOBULIN UR ELPH-MCNC: 3.1 GM/DL
GLUCOSE SERPL-MCNC: 86 MG/DL (ref 65–99)
HBA1C MFR BLD: 5.2 % (ref 4.8–5.6)
HDLC SERPL-MCNC: 33 MG/DL (ref 40–60)
LDLC SERPL CALC-MCNC: 125 MG/DL (ref 0–100)
LDLC/HDLC SERPL: 3.72 {RATIO}
PHOSPHATE SERPL-MCNC: 3.1 MG/DL (ref 2.5–4.5)
POTASSIUM SERPL-SCNC: 4.1 MMOL/L (ref 3.5–5.2)
PROT SERPL-MCNC: 7.9 G/DL (ref 6–8.5)
SODIUM SERPL-SCNC: 137 MMOL/L (ref 136–145)
T4 FREE SERPL-MCNC: 1.26 NG/DL (ref 0.92–1.68)
TRIGL SERPL-MCNC: 122 MG/DL (ref 0–150)
TSH SERPL DL<=0.05 MIU/L-ACNC: 2.05 UIU/ML (ref 0.27–4.2)
VLDLC SERPL-MCNC: 22 MG/DL (ref 5–40)

## 2024-09-18 PROCEDURE — 1160F RVW MEDS BY RX/DR IN RCRD: CPT | Performed by: INTERNAL MEDICINE

## 2024-09-18 PROCEDURE — 1126F AMNT PAIN NOTED NONE PRSNT: CPT | Performed by: INTERNAL MEDICINE

## 2024-09-18 PROCEDURE — 99395 PREV VISIT EST AGE 18-39: CPT | Performed by: INTERNAL MEDICINE

## 2024-09-18 PROCEDURE — 1159F MED LIST DOCD IN RCRD: CPT | Performed by: INTERNAL MEDICINE

## 2024-09-18 PROCEDURE — 83036 HEMOGLOBIN GLYCOSYLATED A1C: CPT | Performed by: INTERNAL MEDICINE

## 2024-09-18 PROCEDURE — 84443 ASSAY THYROID STIM HORMONE: CPT | Performed by: INTERNAL MEDICINE

## 2024-09-18 PROCEDURE — 84100 ASSAY OF PHOSPHORUS: CPT | Performed by: INTERNAL MEDICINE

## 2024-09-18 PROCEDURE — 80061 LIPID PANEL: CPT | Performed by: INTERNAL MEDICINE

## 2024-09-18 PROCEDURE — 82306 VITAMIN D 25 HYDROXY: CPT | Performed by: INTERNAL MEDICINE

## 2024-09-18 PROCEDURE — 2014F MENTAL STATUS ASSESS: CPT | Performed by: INTERNAL MEDICINE

## 2024-09-18 PROCEDURE — 80053 COMPREHEN METABOLIC PANEL: CPT | Performed by: INTERNAL MEDICINE

## 2024-09-18 PROCEDURE — 84439 ASSAY OF FREE THYROXINE: CPT | Performed by: INTERNAL MEDICINE

## 2024-12-11 ENCOUNTER — TELEPHONE (OUTPATIENT)
Dept: INTERNAL MEDICINE | Age: 29
End: 2024-12-11

## 2024-12-11 ENCOUNTER — APPOINTMENT (OUTPATIENT)
Dept: CT IMAGING | Facility: HOSPITAL | Age: 29
End: 2024-12-11
Payer: MEDICAID

## 2024-12-11 ENCOUNTER — HOSPITAL ENCOUNTER (EMERGENCY)
Facility: HOSPITAL | Age: 29
Discharge: HOME | End: 2024-12-11
Attending: EMERGENCY MEDICINE | Admitting: EMERGENCY MEDICINE
Payer: MEDICAID

## 2024-12-11 VITALS
OXYGEN SATURATION: 97 % | TEMPERATURE: 98.2 F | SYSTOLIC BLOOD PRESSURE: 117 MMHG | HEART RATE: 71 BPM | WEIGHT: 211.42 LBS | RESPIRATION RATE: 18 BRPM | BODY MASS INDEX: 30.27 KG/M2 | DIASTOLIC BLOOD PRESSURE: 97 MMHG | HEIGHT: 70 IN

## 2024-12-11 DIAGNOSIS — G44.209 ACUTE NON INTRACTABLE TENSION-TYPE HEADACHE: Primary | ICD-10-CM

## 2024-12-11 PROCEDURE — 70450 CT HEAD/BRAIN W/O DYE: CPT

## 2024-12-11 PROCEDURE — 99284 EMERGENCY DEPT VISIT MOD MDM: CPT

## 2024-12-11 RX ORDER — DIPHENHYDRAMINE HYDROCHLORIDE 50 MG/ML
25 INJECTION INTRAMUSCULAR; INTRAVENOUS ONCE
Status: DISCONTINUED | OUTPATIENT
Start: 2024-12-11 | End: 2024-12-11 | Stop reason: HOSPADM

## 2024-12-11 RX ORDER — KETOROLAC TROMETHAMINE 30 MG/ML
30 INJECTION, SOLUTION INTRAMUSCULAR; INTRAVENOUS ONCE
Status: DISCONTINUED | OUTPATIENT
Start: 2024-12-11 | End: 2024-12-11 | Stop reason: HOSPADM

## 2024-12-11 RX ORDER — METOCLOPRAMIDE HYDROCHLORIDE 5 MG/ML
10 INJECTION INTRAMUSCULAR; INTRAVENOUS ONCE
Status: DISCONTINUED | OUTPATIENT
Start: 2024-12-11 | End: 2024-12-11 | Stop reason: HOSPADM

## 2024-12-11 NOTE — ED PROVIDER NOTES
"Time: 12:33 AM EST  Date of encounter:  12/11/2024  Independent Historian/Clinical History and Information was obtained by:   Patient    History is limited by: N/A    Chief Complaint: HEADACHE      History of Present Illness:    The patient is a 29 y.o. year old male who presents to the emergency department for evaluation of left-sided headache that he states started today about 3:00.  He states that it was on and off for about 3 hours and describes it as a pulsating pain.  He states that he heard a \"crack\" in his brain at the onset.  He states that his left eye feels swollen.  He denies any significant vision changes to the left eye.  He reports that he has had no recent falls or trauma.  He denies any recent fevers.  He states that he has been drinking root beer and is not sure if he is not having some sort of side effects from the root beer but states that when he put in his dental retainer that his symptoms improved.  He denies any neck pain.  He reports no tenderness with palpation.  He has no nuchal rigidity.  He is alert and oriented with a grossly intact neuroexam.  He states he has not noticed any one-sided weakness or difficulties with speech today.      Patient Care Team  Primary Care Provider: Bentley Be MD    Past Medical History:     Allergies   Allergen Reactions    Contrast Dye (Echo Or Unknown Ct/Mr) Rash    Milk (Cow) GI Intolerance     Past Medical History:   Diagnosis Date    Asthma     as child    Food poisoning     Ingrown toenail of both feet     great toe both feet     Past Surgical History:   Procedure Laterality Date    COLONOSCOPY N/A 08/02/2022    Procedure: COLONOSCOPY;  Surgeon: Giacomo Cunningham MD;  Location: [facility] ENDOSCOPY;  Service: Gastroenterology;  Laterality: N/A;  NORMAL COLONOSCOPY    CYSTOSCOPY      ENDOSCOPY N/A 08/02/2022    Procedure: ESOPHAGOGASTRODUODENOSCOPY WITH BX;  Surgeon: Giacomo Cunningham MD;  Location: [facility] ENDOSCOPY;  Service: Gastroenterology; "  Laterality: N/A;  NORMAL EGD    FEMUR FRACTURE SURGERY      ingrown toe nail    WISDOM TOOTH EXTRACTION       Family History   Problem Relation Age of Onset    Lung cancer Father         MALIGNANT    Nephrolithiasis Maternal Uncle     Nephrolithiasis Paternal Aunt     Diabetes Maternal Grandmother     Diabetes Maternal Grandfather     Breast cancer Other 70    Kidney cancer Other     Heart disease Other     Stroke Other     Colon cancer Neg Hx     Malig Hyperthermia Neg Hx        Home Medications:  Prior to Admission medications    Medication Sig Start Date End Date Taking? Authorizing Provider   Sucralfate (CARAFATE PO) Take 1 tablet by mouth As Needed.    Provider, Historical, MD        Social History:   Social History     Tobacco Use    Smoking status: Never     Passive exposure: Current    Smokeless tobacco: Never    Tobacco comments:     SOME SECOND HAND SMOKE EXPOSURE   Vaping Use    Vaping status: Never Used   Substance Use Topics    Alcohol use: Never    Drug use: Never         Review of Systems:  Review of Systems   Constitutional:  Negative for chills and fever.   HENT:  Negative for congestion, ear pain and sore throat.    Eyes:  Negative for photophobia, pain and visual disturbance.   Respiratory:  Negative for cough, chest tightness, shortness of breath and wheezing.    Cardiovascular:  Negative for chest pain.   Gastrointestinal:  Positive for nausea and vomiting. Negative for abdominal pain and diarrhea.   Genitourinary:  Negative for flank pain and hematuria.   Musculoskeletal:  Negative for back pain, joint swelling, neck pain and neck stiffness.   Skin:  Negative for pallor and rash.   Neurological:  Positive for headaches. Negative for dizziness, tremors, seizures, syncope, facial asymmetry, speech difficulty, weakness, light-headedness and numbness.   All other systems reviewed and are negative.       Physical Exam:  /97   Pulse 71   Temp 98.2 °F (36.8 °C) (Oral)   Resp 18   Ht 177.8  "cm (70\")   Wt 95.9 kg (211 lb 6.7 oz)   SpO2 97%   BMI 30.34 kg/m²     Physical Exam  Vitals and nursing note reviewed.   Constitutional:       General: He is not in acute distress.     Appearance: Normal appearance. He is not ill-appearing or toxic-appearing.   HENT:      Head: Normocephalic and atraumatic.   Eyes:      General: No scleral icterus.     Conjunctiva/sclera: Conjunctivae normal.      Pupils: Pupils are equal, round, and reactive to light.   Cardiovascular:      Rate and Rhythm: Normal rate and regular rhythm.      Pulses: Normal pulses.   Pulmonary:      Effort: Pulmonary effort is normal. No respiratory distress.   Musculoskeletal:         General: Normal range of motion.      Cervical back: Normal range of motion and neck supple. No rigidity or tenderness.   Lymphadenopathy:      Cervical: No cervical adenopathy.   Skin:     General: Skin is warm and dry.      Capillary Refill: Capillary refill takes less than 2 seconds.      Findings: No rash.   Neurological:      General: No focal deficit present.      Mental Status: He is alert and oriented to person, place, and time. Mental status is at baseline.   Psychiatric:         Mood and Affect: Mood normal.         Behavior: Behavior normal.                  Medical Decision Making:      Comorbidities that affect care:    Asthma, ingrown toenail, food poisoning    External Notes reviewed:    Previous Clinic Note: Office note from Dr. Be for a well adult exam on 9/18/2024      The following orders were placed and all results were independently analyzed by me:  Orders Placed This Encounter   Procedures    CT Head Without Contrast       Medications Given in the Emergency Department:  Medications - No data to display       ED Course:         Labs:    Lab Results (last 24 hours)       ** No results found for the last 24 hours. **             Imaging:    CT Head Without Contrast    Result Date: 12/11/2024  CT HEAD WO CONTRAST HISTORY: Sudden onset " headache. TECHNIQUE: Axial unenhanced head CT with multiplanar reformats. Radiation dose reduction techniques included automated exposure control or exposure modulation based on body size. COMPARISON: 12/7/2020 FINDINGS: Ventricular size and configuration are normal. No acute infarct or hemorrhage is identified. There are no masses. There is no skull fracture. Mild right cerebellar tonsillar ectopia is unchanged from MRI brain 12/10/2020.     No acute findings and no significant interval change. Electronically Signed: Cesar Hall MD  12/11/2024 2:48 AM EST  Workstation ID: [id number]       Differential Diagnosis and Discussion:    Headache: Differential diagnosis includes but is not limited to migraine, cluster headache, hypertension, tumor, subarachnoid bleeding, pseudotumor cerebri, temporal arteritis, infections, tension headache, and TMJ syndrome.    PROCEDURES:    CT scan radiology impression was interpreted by me.    No orders to display       Procedures    MDM           Patient Care Considerations:    ANTIBIOTICS: I considered prescribing antibiotics as an outpatient however no bacterial focus of infection was found.      Consultants/Shared Management Plan:    None    Social Determinants of Health:    Patient is independent, reliable, and has access to care.       Disposition and Care Coordination:    Discharged: The patient is suitable and stable for discharge with no need for consideration of admission.    I have explained the patient´s condition, diagnoses and treatment plan based on the information available to me at this time. I have answered questions and addressed any concerns. The patient has a good  understanding of the patient´s diagnosis, condition, and treatment plan as can be expected at this point. The vital signs have been stable. The patient´s condition is stable and appropriate for discharge from the emergency department.      The patient will pursue further outpatient evaluation with the  primary care physician or other designated or consulting physician as outlined in the discharge instructions. They are agreeable to this plan of care and follow-up instructions have been explained in detail. The patient has received these instructions in written format and has expressed an understanding of the discharge instructions. The patient is aware that any significant change in condition or worsening of symptoms should prompt an immediate return to this or the closest emergency department or call to 911.  I have explained discharge medications and the need for follow up with the patient/caretakers. This was also printed in the discharge instructions. Patient was discharged with the following medications and follow up:      Medication List      No changes were made to your prescriptions during this visit.      Bentley Be MD  [address] [facility]  [address] [place] [id number]  [phone]    Call today  FOR FOLLOW UP       Final diagnoses:   Acute non intractable tension-type headache        ED Disposition       ED Disposition   Discharge    Condition   Stable    Comment   --               This medical record created using voice recognition software.             Anushka Morelos, PURA  12/11/24 [id number]

## 2024-12-11 NOTE — TELEPHONE ENCOUNTER
Caller: Chris Gomez    Relationship to patient: Self    Best call back number: [phone]    Patient is needing: PATIENT IS REPORTING THAT HE WENT TO THE ER. THEY DID AN MRI ON HIS BRAIN AND NOTHING CAME BACK ON IT. NO APPOINTMENT WAS SCHEDULED FOR HOSPITAL FOLLOW UP BECAUSE HE SAID HE DIDN'T NEED ONE. PLEASE CALL AND ADVISE.

## 2024-12-11 NOTE — DISCHARGE INSTRUCTIONS
Your head CT was negative for any emergent conditions today in the emergency department.  Rest, drink plenty of fluids.  You may take over-the-counter acetaminophen and Motrin as needed for aches pains and fever.  Call Dr. HARTMANN today and follow-up with him as directed for further evaluation and treatment.  Return to the emergency department immediately for any acutely developing neurological symptoms, any altered mental status, any persistent vomiting or fevers of 101 or greater or any new or worse concerns.

## 2024-12-11 NOTE — ED NOTES
"Patient declines all medications, states \"I dont know if I am allergic and I dont want the medications this time\".   "

## 2024-12-16 ENCOUNTER — HOSPITAL ENCOUNTER (EMERGENCY)
Facility: HOSPITAL | Age: 29
Discharge: HOME | End: 2024-12-16
Attending: EMERGENCY MEDICINE | Admitting: EMERGENCY MEDICINE
Payer: MEDICAID

## 2024-12-16 ENCOUNTER — APPOINTMENT (OUTPATIENT)
Dept: GENERAL RADIOLOGY | Facility: HOSPITAL | Age: 29
End: 2024-12-16
Payer: MEDICAID

## 2024-12-16 VITALS
WEIGHT: 207.45 LBS | HEART RATE: 83 BPM | OXYGEN SATURATION: 97 % | SYSTOLIC BLOOD PRESSURE: 122 MMHG | BODY MASS INDEX: 29.7 KG/M2 | RESPIRATION RATE: 16 BRPM | DIASTOLIC BLOOD PRESSURE: 84 MMHG | HEIGHT: 70 IN | TEMPERATURE: 98.7 F

## 2024-12-16 DIAGNOSIS — U07.1 COVID-19: Primary | ICD-10-CM

## 2024-12-16 LAB
ALBUMIN SERPL-MCNC: 4.5 G/DL (ref 3.5–5.2)
ALBUMIN/GLOB SERPL: 1.3 G/DL
ALP SERPL-CCNC: 102 U/L (ref 39–117)
ALT SERPL W P-5'-P-CCNC: 32 U/L (ref 1–41)
ANION GAP SERPL CALCULATED.3IONS-SCNC: 11.6 MMOL/L (ref 5–15)
AST SERPL-CCNC: 27 U/L (ref 1–40)
BASOPHILS # BLD AUTO: 0.04 10*3/MM3 (ref 0–0.2)
BASOPHILS NFR BLD AUTO: 0.6 % (ref 0–1.5)
BILIRUB SERPL-MCNC: 0.4 MG/DL (ref 0–1.2)
BUN SERPL-MCNC: 7 MG/DL (ref 6–20)
BUN/CREAT SERPL: 6.2 (ref 7–25)
CALCIUM SPEC-SCNC: 9.3 MG/DL (ref 8.6–10.5)
CHLORIDE SERPL-SCNC: 101 MMOL/L (ref 98–107)
CO2 SERPL-SCNC: 26.4 MMOL/L (ref 22–29)
CREAT SERPL-MCNC: 1.13 MG/DL (ref 0.76–1.27)
DEPRECATED RDW RBC AUTO: 37.9 FL (ref 37–54)
EGFRCR SERPLBLD CKD-EPI 2021: 90.2 ML/MIN/1.73
EOSINOPHIL # BLD AUTO: 0.04 10*3/MM3 (ref 0–0.4)
EOSINOPHIL NFR BLD AUTO: 0.6 % (ref 0.3–6.2)
ERYTHROCYTE [DISTWIDTH] IN BLOOD BY AUTOMATED COUNT: 12.4 % (ref 12.3–15.4)
FLUAV SUBTYP SPEC NAA+PROBE: NOT DETECTED
FLUBV RNA ISLT QL NAA+PROBE: NOT DETECTED
GEN 5 1HR TROPONIN T REFLEX: <6 NG/L
GLOBULIN UR ELPH-MCNC: 3.6 GM/DL
GLUCOSE SERPL-MCNC: 104 MG/DL (ref 65–99)
HCT VFR BLD AUTO: 41 % (ref 37.5–51)
HGB BLD-MCNC: 13.9 G/DL (ref 13–17.7)
HOLD SPECIMEN: NORMAL
HOLD SPECIMEN: NORMAL
IMM GRANULOCYTES # BLD AUTO: 0.03 10*3/MM3 (ref 0–0.05)
IMM GRANULOCYTES NFR BLD AUTO: 0.4 % (ref 0–0.5)
LIPASE SERPL-CCNC: 46 U/L (ref 13–60)
LYMPHOCYTES # BLD AUTO: 1.7 10*3/MM3 (ref 0.7–3.1)
LYMPHOCYTES NFR BLD AUTO: 24.5 % (ref 19.6–45.3)
MAGNESIUM SERPL-MCNC: 2.2 MG/DL (ref 1.6–2.6)
MCH RBC QN AUTO: 28.8 PG (ref 26.6–33)
MCHC RBC AUTO-ENTMCNC: 33.9 G/DL (ref 31.5–35.7)
MCV RBC AUTO: 85.1 FL (ref 79–97)
MONOCYTES # BLD AUTO: 1.33 10*3/MM3 (ref 0.1–0.9)
MONOCYTES NFR BLD AUTO: 19.2 % (ref 5–12)
NEUTROPHILS NFR BLD AUTO: 3.79 10*3/MM3 (ref 1.7–7)
NEUTROPHILS NFR BLD AUTO: 54.7 % (ref 42.7–76)
NRBC BLD AUTO-RTO: 0 /100 WBC (ref 0–0.2)
NT-PROBNP SERPL-MCNC: <36 PG/ML (ref 0–450)
PLATELET # BLD AUTO: 199 10*3/MM3 (ref 140–450)
PMV BLD AUTO: 11.3 FL (ref 6–12)
POTASSIUM SERPL-SCNC: 3.7 MMOL/L (ref 3.5–5.2)
PROT SERPL-MCNC: 8.1 G/DL (ref 6–8.5)
QT INTERVAL: 345 MS
QTC INTERVAL: 404 MS
RBC # BLD AUTO: 4.82 10*6/MM3 (ref 4.14–5.8)
RSV RNA NPH QL NAA+NON-PROBE: NOT DETECTED
S PYO AG THROAT QL: NEGATIVE
SARS-COV-2 RNA RESP QL NAA+PROBE: DETECTED
SODIUM SERPL-SCNC: 139 MMOL/L (ref 136–145)
TROPONIN T NUMERIC DELTA: NORMAL
TROPONIN T SERPL HS-MCNC: 7 NG/L
WBC NRBC COR # BLD AUTO: 6.93 10*3/MM3 (ref 3.4–10.8)
WHOLE BLOOD HOLD COAG: NORMAL
WHOLE BLOOD HOLD SPECIMEN: NORMAL

## 2024-12-16 PROCEDURE — 99284 EMERGENCY DEPT VISIT MOD MDM: CPT

## 2024-12-16 PROCEDURE — 83690 ASSAY OF LIPASE: CPT

## 2024-12-16 PROCEDURE — 71045 X-RAY EXAM CHEST 1 VIEW: CPT

## 2024-12-16 PROCEDURE — 87880 STREP A ASSAY W/OPTIC: CPT | Performed by: EMERGENCY MEDICINE

## 2024-12-16 PROCEDURE — 87637 SARSCOV2&INF A&B&RSV AMP PRB: CPT | Performed by: EMERGENCY MEDICINE

## 2024-12-16 PROCEDURE — 84484 ASSAY OF TROPONIN QUANT: CPT | Performed by: EMERGENCY MEDICINE

## 2024-12-16 PROCEDURE — 36415 COLL VENOUS BLD VENIPUNCTURE: CPT

## 2024-12-16 PROCEDURE — 87081 CULTURE SCREEN ONLY: CPT | Performed by: EMERGENCY MEDICINE

## 2024-12-16 PROCEDURE — 84484 ASSAY OF TROPONIN QUANT: CPT

## 2024-12-16 PROCEDURE — 85025 COMPLETE CBC W/AUTO DIFF WBC: CPT

## 2024-12-16 PROCEDURE — 80053 COMPREHEN METABOLIC PANEL: CPT

## 2024-12-16 PROCEDURE — 83880 ASSAY OF NATRIURETIC PEPTIDE: CPT

## 2024-12-16 PROCEDURE — 93005 ELECTROCARDIOGRAM TRACING: CPT | Performed by: EMERGENCY MEDICINE

## 2024-12-16 PROCEDURE — 93005 ELECTROCARDIOGRAM TRACING: CPT

## 2024-12-16 PROCEDURE — 83735 ASSAY OF MAGNESIUM: CPT

## 2024-12-16 RX ORDER — ASPIRIN 81 MG/1
324 TABLET, CHEWABLE ORAL ONCE
Status: DISCONTINUED | OUTPATIENT
Start: 2024-12-16 | End: 2024-12-16

## 2024-12-16 RX ORDER — SODIUM CHLORIDE 0.9 % (FLUSH) 0.9 %
10 SYRINGE (ML) INJECTION AS NEEDED
Status: DISCONTINUED | OUTPATIENT
Start: 2024-12-16 | End: 2024-12-16 | Stop reason: HOSPADM

## 2024-12-16 RX ORDER — ALBUTEROL SULFATE 90 UG/1
2 INHALANT RESPIRATORY (INHALATION) 4 TIMES DAILY PRN
Qty: 18 G | Refills: 0 | Status: SHIPPED | OUTPATIENT
Start: 2024-12-16

## 2024-12-16 RX ORDER — CODEINE PHOSPHATE AND GUAIFENESIN 10; 100 MG/5ML; MG/5ML
5 SOLUTION ORAL 3 TIMES DAILY PRN
Qty: 118 ML | Refills: 0 | Status: SHIPPED | OUTPATIENT
Start: 2024-12-16

## 2024-12-16 NOTE — ED PROVIDER NOTES
Time: 1:14 PM EST  Date of encounter:  12/16/2024  Independent Historian/Clinical History and Information was obtained by:   Patient    History is limited by: N/A    Chief Complaint   Patient presents with    Chest Pain     Patient complaining of chest pain to the left chest. Patient states the chest pain started this morning and is relieved by belching.          History of Present Illness:  Patient is a 29 y.o. year old male who presents to the emergency department for evaluation of chest pain.  Patient reports pain began this morning, is located to left portion of his chest and radiates up to his jaw and down his left arm.  Reports on the way here he had an episode of belching which helped his pain.  Denies blood thinners.  Denies shortness of breath.  Denies leg swelling.  (Provider in triage, Mahsa Hartman PA-C)    Patient Care Team  Primary Care Provider: Bentley Be MD    Past Medical History:     Allergies   Allergen Reactions    Contrast Dye (Echo Or Unknown Ct/Mr) Rash    Milk (Cow) GI Intolerance     Past Medical History:   Diagnosis Date    Asthma     as child    Food poisoning     Ingrown toenail of both feet     great toe both feet     Past Surgical History:   Procedure Laterality Date    COLONOSCOPY N/A 08/02/2022    Procedure: COLONOSCOPY;  Surgeon: Giacomo Cunningham MD;  Location: [facility] ENDOSCOPY;  Service: Gastroenterology;  Laterality: N/A;  NORMAL COLONOSCOPY    CYSTOSCOPY      ENDOSCOPY N/A 08/02/2022    Procedure: ESOPHAGOGASTRODUODENOSCOPY WITH BX;  Surgeon: Giacomo Cunningham MD;  Location: [facility] ENDOSCOPY;  Service: Gastroenterology;  Laterality: N/A;  NORMAL EGD    FEMUR FRACTURE SURGERY      ingrown toe nail    WISDOM TOOTH EXTRACTION       Family History   Problem Relation Age of Onset    Lung cancer Father         MALIGNANT    Nephrolithiasis Maternal Uncle     Nephrolithiasis Paternal Aunt     Diabetes Maternal Grandmother     Diabetes Maternal Grandfather     Breast cancer  "Other 70    Kidney cancer Other     Heart disease Other     Stroke Other     Colon cancer Neg Hx     Malig Hyperthermia Neg Hx        Home Medications:  Prior to Admission medications    Medication Sig Start Date End Date Taking? Authorizing Provider   Sucralfate (CARAFATE PO) Take 1 tablet by mouth As Needed.    Provider, Tip, MD        Social History:   Social History     Tobacco Use    Smoking status: Never     Passive exposure: Current    Smokeless tobacco: Never    Tobacco comments:     SOME SECOND HAND SMOKE EXPOSURE   Vaping Use    Vaping status: Never Used   Substance Use Topics    Alcohol use: Never    Drug use: Never         Review of Systems:  Review of Systems   Constitutional:  Negative for chills and fever.   HENT:  Negative for congestion, rhinorrhea and sore throat.    Eyes:  Negative for pain and visual disturbance.   Respiratory:  Positive for cough. Negative for apnea, chest tightness, shortness of breath and wheezing.    Cardiovascular:  Positive for chest pain. Negative for palpitations and leg swelling.   Gastrointestinal:  Negative for abdominal pain, diarrhea, nausea and vomiting.   Genitourinary:  Negative for difficulty urinating and dysuria.   Musculoskeletal:  Negative for joint swelling and myalgias.   Skin:  Negative for color change.   Neurological:  Negative for seizures and headaches.   Psychiatric/Behavioral: Negative.     All other systems reviewed and are negative.       Physical Exam:  /73   Pulse 78   Temp 98.9 °F (37.2 °C) (Oral)   Resp 16   Ht 177.8 cm (70\")   Wt 94.1 kg (207 lb 7.3 oz)   SpO2 96%   BMI 29.77 kg/m²         Physical Exam  Vitals and nursing note reviewed.   Constitutional:       General: He is not in acute distress.     Appearance: Normal appearance. He is not toxic-appearing.   HENT:      Head: Normocephalic and atraumatic.      Jaw: There is normal jaw occlusion.   Eyes:      General: Lids are normal.      Extraocular Movements: " Extraocular movements intact.      Conjunctiva/sclera: Conjunctivae normal.      Pupils: Pupils are equal, round, and reactive to light.   Cardiovascular:      Rate and Rhythm: Normal rate and regular rhythm.      Pulses: Normal pulses.      Heart sounds: Normal heart sounds.   Pulmonary:      Effort: Pulmonary effort is normal. No respiratory distress.      Breath sounds: Normal breath sounds. No wheezing or rhonchi.   Abdominal:      General: Abdomen is flat. There is no distension.      Palpations: Abdomen is soft.      Tenderness: There is no abdominal tenderness. There is no guarding or rebound.   Musculoskeletal:         General: Normal range of motion.      Cervical back: Normal range of motion and neck supple.      Right lower leg: No edema.      Left lower leg: No edema.   Skin:     General: Skin is warm and dry.      Coloration: Skin is not cyanotic.   Neurological:      Mental Status: He is alert and oriented to person, place, and time. Mental status is at baseline.   Psychiatric:         Attention and Perception: Attention and perception normal.         Mood and Affect: Mood normal.                            Medical Decision Making:      Comorbidities that affect care:    Obesity, celiac    External Notes reviewed:    None      The following orders were placed and all results were independently analyzed by me:  Orders Placed This Encounter   Procedures    COVID-19, FLU A/B, RSV PCR 1 HR TAT - Swab, Nasopharynx    Rapid Strep A Screen - Swab, Throat    Beta Strep Culture, Throat - Swab, Throat    XR Chest 1 View    Hunter Draw    High Sensitivity Troponin T    Comprehensive Metabolic Panel    Lipase    BNP    Magnesium    CBC Auto Differential    High Sensitivity Troponin T 1Hr    NPO Diet NPO Type: Strict NPO    Undress & Gown    Continuous Pulse Oximetry    Oxygen Therapy- Nasal Cannula; Titrate 1-6 LPM Per SpO2; 90 - 95%    ECG 12 Lead ED Triage Standing Order; Chest Pain    ECG 12 Lead ED Triage  Standing Order; Chest Pain    CBC & Differential    Green Top (Gel)    Lavender Top    Gold Top - SST    Light Blue Top       Medications Given in the Emergency Department:  Medications   sodium chloride 0.9 % flush 10 mL (has no administration in time range)        ED Course:    The patient was initially evaluated in the triage area where orders were placed. The patient was later dispositioned by Jevon Mares MD.      The patient was advised to stay for completion of workup which includes but is not limited to communication of labs and radiological results, reassessment and plan. The patient was advised that leaving prior to disposition by a provider could result in critical findings that are not communicated to the patient.     ED Course as of 12/16/24 1638   Mon Dec 16, 2024   1315 PROVIDER IN TRIAGE  Patient was evaluated by me in triage Bryanna Hartman PA-C.  Orders are placed and patient is currently awaiting final results and disposition.  [AS]      ED Course User Index  [AS] Bryanna Hartman PA-C       Labs:    Lab Results (last 24 hours)       Procedure Component Value Units Date/Time    High Sensitivity Troponin T [[id number]]  (Normal) Collected: 12/16/24 1321    Specimen: Blood from Arm, Right Updated: 12/16/24 1409     HS Troponin T 7 ng/L     Narrative:      High Sensitive Troponin T Reference Range:  <14.0 ng/L- Negative Female for AMI  <22.0 ng/L- Negative Male for AMI  >=14 - Abnormal Female indicating possible myocardial injury.  >=22 - Abnormal Male indicating possible myocardial injury.   Clinicians would have to utilize clinical acumen, EKG, Troponin, and serial changes to determine if it is an Acute Myocardial Infarction or myocardial injury due to an underlying chronic condition.         CBC & Differential [[id number]]  (Abnormal) Collected: 12/16/24 1321    Specimen: Blood from Arm, Right Updated: 12/16/24 1344    Narrative:      The following orders were created for panel order CBC &  Differential.  Procedure                               Abnormality         Status                     ---------                               -----------         ------                     CBC Auto Differential[[id number]]        Abnormal            Final result                 Please view results for these tests on the individual orders.    Comprehensive Metabolic Panel [[id number]]  (Abnormal) Collected: 12/16/24 1321    Specimen: Blood from Arm, Right Updated: 12/16/24 1405     Glucose 104 mg/dL      BUN 7 mg/dL      Creatinine 1.13 mg/dL      Sodium 139 mmol/L      Potassium 3.7 mmol/L      Chloride 101 mmol/L      CO2 26.4 mmol/L      Calcium 9.3 mg/dL      Total Protein 8.1 g/dL      Albumin 4.5 g/dL      ALT (SGPT) 32 U/L      AST (SGOT) 27 U/L      Alkaline Phosphatase 102 U/L      Total Bilirubin 0.4 mg/dL      Globulin 3.6 gm/dL      A/G Ratio 1.3 g/dL      BUN/Creatinine Ratio 6.2     Anion Gap 11.6 mmol/L      eGFR 90.2 mL/min/1.73     Narrative:      GFR Categories in Chronic Kidney Disease (CKD)      GFR Category          GFR (mL/min/1.73)    Interpretation  G1                     90 or greater         Normal or high (1)  G2                      60-89                Mild decrease (1)  G3a                   45-59                Mild to moderate decrease  G3b                   30-44                Moderate to severe decrease  G4                    15-29                Severe decrease  G5                    14 or less           Kidney failure          (1)In the absence of evidence of kidney disease, neither GFR category G1 or G2 fulfill the criteria for CKD.    eGFR calculation 2021 CKD-EPI creatinine equation, which does not include race as a factor    Lipase [[id number]]  (Normal) Collected: 12/16/24 1321    Specimen: Blood from Arm, Right Updated: 12/16/24 1405     Lipase 46 U/L     BNP [[id number]]  (Normal) Collected: 12/16/24 1321    Specimen: Blood from Arm, Right Updated: 12/16/24 1409     proBNP  <36.0 pg/mL     Narrative:      This assay is used as an aid in the diagnosis of individuals suspected of having heart failure. It can be used as an aid in the diagnosis of acute decompensated heart failure (ADHF) in patients presenting with signs and symptoms of ADHF to the emergency department (ED). In addition, NT-proBNP of <300 pg/mL indicates ADHF is not likely.    Age Range Result Interpretation  NT-proBNP Concentration (pg/mL:      <50             Positive            >450                   Gray                 300-450                    Negative             <300    50-75           Positive            >900                  Gray                300-900                  Negative            <300      >75             Positive            >1800                  Gray                300-1800                  Negative            <300    Magnesium [[id number]]  (Normal) Collected: 12/16/24 1321    Specimen: Blood from Arm, Right Updated: 12/16/24 1405     Magnesium 2.2 mg/dL     CBC Auto Differential [[id number]]  (Abnormal) Collected: 12/16/24 1321    Specimen: Blood from Arm, Right Updated: 12/16/24 1344     WBC 6.93 10*3/mm3      RBC 4.82 10*6/mm3      Hemoglobin 13.9 g/dL      Hematocrit 41.0 %      MCV 85.1 fL      MCH 28.8 pg      MCHC 33.9 g/dL      RDW 12.4 %      RDW-SD 37.9 fl      MPV 11.3 fL      Platelets 199 10*3/mm3      Neutrophil % 54.7 %      Lymphocyte % 24.5 %      Monocyte % 19.2 %      Eosinophil % 0.6 %      Basophil % 0.6 %      Immature Grans % 0.4 %      Neutrophils, Absolute 3.79 10*3/mm3      Lymphocytes, Absolute 1.70 10*3/mm3      Monocytes, Absolute 1.33 10*3/mm3      Eosinophils, Absolute 0.04 10*3/mm3      Basophils, Absolute 0.04 10*3/mm3      Immature Grans, Absolute 0.03 10*3/mm3      nRBC 0.0 /100 WBC     COVID-19, FLU A/B, RSV PCR 1 HR TAT - Swab, Nasopharynx [[id number]]  (Abnormal) Collected: 12/16/24 1458    Specimen: Swab from Nasopharynx Updated: 12/16/24 1602     COVID19 Detected      Influenza A PCR Not Detected     Influenza B PCR Not Detected     RSV, PCR Not Detected    Narrative:      Fact sheet for providers: https://www.fda.gov/media/173138/download    Fact sheet for patients: https://www.fda.gov/media/614475/download    Test performed by PCR.    Rapid Strep A Screen - Swab, Throat [[id number]]  (Normal) Collected: 12/16/24 1458    Specimen: Swab from Throat Updated: 12/16/24 1521     Strep A Ag Negative    Beta Strep Culture, Throat - Swab, Throat [[id number]] Collected: 12/16/24 1458    Specimen: Swab from Throat Updated: 12/16/24 1521    High Sensitivity Troponin T 1Hr [[id number]] Collected: 12/16/24 1502    Specimen: Blood Updated: 12/16/24 1526     HS Troponin T <6 ng/L      Troponin T Delta --     Comment: Unable to calculate.       Narrative:      High Sensitive Troponin T Reference Range:  <14.0 ng/L- Negative Female for AMI  <22.0 ng/L- Negative Male for AMI  >=14 - Abnormal Female indicating possible myocardial injury.  >=22 - Abnormal Male indicating possible myocardial injury.   Clinicians would have to utilize clinical acumen, EKG, Troponin, and serial changes to determine if it is an Acute Myocardial Infarction or myocardial injury due to an underlying chronic condition.                  Imaging:    XR Chest 1 View    Result Date: 12/16/2024  XR CHEST 1 VW Date of Exam: 12/16/2024 1:25 PM EST Indication: Chest Pain Triage Protocol Comparison: June 25, 2020 Findings: There is a calcified granuloma in the right midlung. The lungs are otherwise clear. The heart and mediastinal contours appear normal. The pulmonary vasculature appears normal. The osseous structures appear intact.     Impression: No acute cardiopulmonary process. Electronically Signed: Adair Bellamy MD  12/16/2024 1:41 PM EST  Workstation ID: [id number]       Differential Diagnosis and Discussion:      Chest Pain:  Based on the patient's signs and symptoms, I considered aortic dissection, myocardial infaction,  pulmonary embolism, cardiac tamponade, pericarditis, pneumothorax, musculoskeletal chest pain and other differential diagnosis as an etiology of the patient's chest pain.   Cough: Differential diagnosis includes but is not limited to pneumonia, acute bronchitis, upper respiratory infection, ACE inhibitor use, allergic reaction, epiglottitis, seasonal allergies, chemical irritants, exercise-induced asthma, viral syndrome.    PROCEDURES:    Labs were drawn in the emergency department and all labs were reviewed and interpreted by me.  X-ray were performed in the emergency department and all X-ray impressions were independently interpreted by me.  An EKG was performed and the EKG was interpreted by me.    ECG 12 Lead ED Triage Standing Order; Chest Pain   Preliminary Result   HEART RATE=82  bpm   RR Zdhctwng=807  ms   NJ Evrscdih=162  ms   P Horizontal Axis=7  deg   P Front Axis=43  deg   QRSD Interval=82  ms   QT Qbdjwztu=430  ms   FNnN=554  ms   QRS Axis=43  deg   T Wave Axis=26  deg   - OTHERWISE NORMAL ECG -   Sinus rhythm   Consider left atrial enlargement   Date and Time of Study:2024-12-16 13:16:18         Interpretation of EKG shows normal sinus rhythm, normal rate, no acute ischemia, normal QT    Procedures    MDM  Number of Diagnoses or Management Options  COVID-19  Diagnosis management comments: Insert my this is a 29-year-old male patient who presents to the emergency department for evaluation of chest discomfort and cough.  High-sensitivity troponin independently reviewed and interpreted by me x 2 was unremarkable for acute pathology.  Chest x-ray reviewed by me is unremarkable for acute pathology.  CBC independently reviewed and interpreted by me and shows no critical abnormalities.  CMP independently reviewed and interpreted by me and shows no critical abnormalities.  COVID-19 test positive.  Patient is otherwise well-appearing in no acute distress, specifically no acute respiratory distress.  He will be  discharged home with prescription albuterol inhaler and cough medicine.  Very strict return to ER and follow-up instructions have been provided to the patient.                       Patient Care Considerations:    CONSULT: I considered consulting cardiology, however negative cardiac workup      Consultants/Shared Management Plan:    None    Social Determinants of Health:    Patient is independent, reliable, and has access to care.       Disposition and Care Coordination:    Discharged: I considered escalation of care by admitting this patient to the hospital, however negative cardiac workup    I have explained the patient´s condition, diagnoses and treatment plan based on the information available to me at this time. I have answered questions and addressed any concerns. The patient has a good  understanding of the patient´s diagnosis, condition, and treatment plan as can be expected at this point. The vital signs have been stable. The patient´s condition is stable and appropriate for discharge from the emergency department.      The patient will pursue further outpatient evaluation with the primary care physician or other designated or consulting physician as outlined in the discharge instructions. They are agreeable to this plan of care and follow-up instructions have been explained in detail. The patient has received these instructions in written format and has expressed an understanding of the discharge instructions. The patient is aware that any significant change in condition or worsening of symptoms should prompt an immediate return to this or the closest emergency department or call to 911.  I have explained discharge medications and the need for follow up with the patient/caretakers. This was also printed in the discharge instructions. Patient was discharged with the following medications and follow up:      Medication List        New Prescriptions      albuterol sulfate  (90 Base) MCG/ACT  inhaler  Commonly known as: PROVENTIL HFA;VENTOLIN HFA;PROAIR HFA  Inhale 2 puffs 4 (Four) Times a Day As Needed for Wheezing.     guaiFENesin-codeine 100-10 MG/5ML solution/syrup  Commonly known as: ROMILAR-AC  Take 5 mL by mouth 3 (Three) Times a Day As Needed for Cough.               Where to Get Your Medications        These medications were sent to [facility] [id number] - [place], [place] - [address] AT [facility] [address] - [phone]  - [phone] FX  [address], [facility] [address]      Phone: [phone]   albuterol sulfate  (90 Base) MCG/ACT inhaler  guaiFENesin-codeine 100-10 MG/5ML solution/syrup      Bentley Be MD  [address]  [address]  [facility] [id number] [phone]    In 1 week         Final diagnoses:   COVID-19        ED Disposition       ED Disposition   Discharge    Condition   Stable    Comment   --               This medical record created using voice recognition software.             Jevon Mares MD  12/16/24 1235

## 2024-12-18 LAB — BACTERIA SPEC AEROBE CULT: NORMAL

## 2025-01-02 LAB
QT INTERVAL: 345 MS
QTC INTERVAL: 404 MS

## 2025-01-21 ENCOUNTER — OFFICE VISIT (OUTPATIENT)
Dept: INTERNAL MEDICINE | Age: 30
End: 2025-01-21
Payer: MEDICAID

## 2025-01-21 VITALS
HEART RATE: 84 BPM | SYSTOLIC BLOOD PRESSURE: 108 MMHG | HEIGHT: 70 IN | BODY MASS INDEX: 30.69 KG/M2 | WEIGHT: 214.4 LBS | DIASTOLIC BLOOD PRESSURE: 60 MMHG | OXYGEN SATURATION: 98 % | TEMPERATURE: 97.1 F

## 2025-01-21 DIAGNOSIS — J35.8 TONSIL STONE: Primary | ICD-10-CM

## 2025-01-21 PROBLEM — J02.8 ACUTE PHARYNGITIS DUE TO OTHER SPECIFIED ORGANISMS: Status: ACTIVE | Noted: 2025-01-21

## 2025-01-21 PROBLEM — J02.9 PHARYNGITIS, ACUTE: Status: ACTIVE | Noted: 2025-01-21

## 2025-01-21 PROCEDURE — 1126F AMNT PAIN NOTED NONE PRSNT: CPT | Performed by: INTERNAL MEDICINE

## 2025-01-21 PROCEDURE — 1159F MED LIST DOCD IN RCRD: CPT | Performed by: INTERNAL MEDICINE

## 2025-01-21 PROCEDURE — 99213 OFFICE O/P EST LOW 20 MIN: CPT | Performed by: INTERNAL MEDICINE

## 2025-01-21 PROCEDURE — 1160F RVW MEDS BY RX/DR IN RCRD: CPT | Performed by: INTERNAL MEDICINE

## 2025-01-21 RX ORDER — CEPHALEXIN 500 MG/1
500 CAPSULE ORAL 3 TIMES DAILY
Qty: 21 CAPSULE | Refills: 0 | Status: SHIPPED | OUTPATIENT
Start: 2025-01-21

## 2025-01-21 RX ORDER — LIDOCAINE HYDROCHLORIDE 20 MG/ML
SOLUTION OROPHARYNGEAL
Qty: 180 ML | Refills: 1 | Status: SHIPPED | OUTPATIENT
Start: 2025-01-21

## 2025-01-21 NOTE — ASSESSMENT & PLAN NOTE
Patient is having increased issues with this as of his 1/25 OV.  He is using some salt water gargles.  He does not have any fevers, he did have COVID back around [place].  He does have some dysphagia with swallowing, but not with swallowing food.    Oropharynx is clear of obvious tonsillar stone at this time, he has discomfort much further down in the pharyngeal area.  No obvious lymphadenopathy.    Will treat empirically for possible infected stone/node, and give him some Chula's Magic mouthwash as well.

## 2025-01-21 NOTE — PROGRESS NOTES
"Chief Complaint  Tonsil stones (Pt states that he has had tonsil stones since 2018, he states that they are inflamed and discolored, he states that his throat hurts some depending on if he has enough mucus that day. ), Right sided discomfort (Pt states depending on what he eats he can tell more discomfort. /He states that this comes and goes. No changes in bowel movements He states that his when he urinates he has trouble getting his flow started. ), and Skin on hands are dry    Subjective      Chris Gomez presents to [facility]    History of present illness:  Patient 29-year-old male with celiac disease, reflux, microscopic hematuria, obesity, among others, seen 9/23 for New Patient appointment, who is coming in now 9/24 for 6-month follow-up.  We will review his med list, go over any recent labs and management of pain, address his care gaps, and make further recommendations at that time. (Will just get labs at 's).---> patient is here 1/25 for issues in regards to sore throat and possible tonsillar stones.    Review of Systems   Constitutional:  Negative for appetite change, fatigue and fever.   HENT:  Negative for congestion and ear pain.    Eyes:  Negative for blurred vision.   Respiratory:  Negative for cough, chest tightness, shortness of breath and wheezing.    Cardiovascular:  Negative for chest pain, palpitations and leg swelling.   Gastrointestinal:  Negative for abdominal pain.   Genitourinary:  Negative for difficulty urinating, dysuria and hematuria.   Musculoskeletal:  Negative for arthralgias and gait problem.   Skin:  Negative for skin lesions.   Neurological:  Negative for syncope, memory problem and confusion.   Psychiatric/Behavioral:  Negative for self-injury and depressed mood.        Objective   Vital Signs:   /60   Pulse 84   Temp 97.1 °F (36.2 °C) (Skin)   Ht 177.8 cm (70\")   Wt 97.3 kg (214 lb 6.4 oz)   SpO2 98%   BMI 30.76 kg/m²   "         Physical Exam  Vitals and nursing note reviewed.   Constitutional:       General: He is not in acute distress.     Appearance: Normal appearance. He is not toxic-appearing.   HENT:      Head: Atraumatic.      Right Ear: External ear normal.      Left Ear: External ear normal.      Nose: Nose normal.      Mouth/Throat:      Mouth: Mucous membranes are moist.   Eyes:      General:         Right eye: No discharge.         Left eye: No discharge.      Extraocular Movements: Extraocular movements intact.      Pupils: Pupils are equal, round, and reactive to light.   Cardiovascular:      Rate and Rhythm: Normal rate and regular rhythm.      Pulses: Normal pulses.      Heart sounds: Normal heart sounds. No murmur heard.     No gallop.   Pulmonary:      Effort: Pulmonary effort is normal. No respiratory distress.      Breath sounds: No wheezing, rhonchi or rales.   Abdominal:      General: There is no distension.      Palpations: Abdomen is soft. There is no mass.      Tenderness: There is no abdominal tenderness. There is no guarding.   Musculoskeletal:         General: No swelling or tenderness.      Cervical back: No tenderness.      Right lower leg: No edema.      Left lower leg: No edema.   Skin:     General: Skin is warm and dry.      Findings: No rash.   Neurological:      General: No focal deficit present.      Mental Status: He is alert and oriented to person, place, and time. Mental status is at baseline.      Motor: No weakness.      Gait: Gait normal.   Psychiatric:         Mood and Affect: Mood normal.         Thought Content: Thought content normal.          Result Review   The following data was reviewed by: Bentley Be MD on 09/15/2023:             Assessment and Plan   Diagnoses and all orders for this visit:    1. Tonsil stone (Primary)  Assessment & Plan:  Patient is having increased issues with this as of his 1/25 OV.  He is using some salt water gargles.  He does not have any fevers, he did  have COVID back around [place].  He does have some dysphagia with swallowing, but not with swallowing food.    Oropharynx is clear of obvious tonsillar stone at this time, he has discomfort much further down in the pharyngeal area.  No obvious lymphadenopathy.    Will treat empirically for possible infected stone/node, and give him some Ch[employer]'s Magic mouthwash as well.      Other orders  -     cephalexin (Keflex) 500 MG capsule; Take 1 capsule by mouth 3 (Three) Times a Day.  Dispense: 21 capsule; Refill: 0  -     Lidocaine Viscous HCl (XYLOCAINE) 2 % solution; 10 mL gargle and swallow every 4 hours as needed for sore throat.  Dispense: 180 mL; Refill: 1          BMI is >= 30 and <35. (Class 1 Obesity). The following options were offered after discussion;: exercise counseling/recommendations and nutrition counseling/recommendations      Follow Up   Return for Next scheduled follow up.  Patient was given instructions and counseling regarding his condition or for health maintenance advice. Please see specific information pulled into the AVS if appropriate.     Total Time Spent:   minutes     This time includes time spent by me in the following activities: preparing for the visit, reviewing extensive past medical history and tests, performing a medically appropriate examination and/or evaluation, counseling and educating the patient and/or caregivers, ordering medications, tests, or procedures, referring and/or communicating with other health care professionals and documenting information in the medical record all on this date of service.

## 2025-01-31 ENCOUNTER — OFFICE VISIT (OUTPATIENT)
Dept: INTERNAL MEDICINE | Age: 30
End: 2025-01-31
Payer: MEDICAID

## 2025-01-31 VITALS
DIASTOLIC BLOOD PRESSURE: 70 MMHG | TEMPERATURE: 97.1 F | HEART RATE: 88 BPM | OXYGEN SATURATION: 96 % | HEIGHT: 70 IN | SYSTOLIC BLOOD PRESSURE: 111 MMHG | BODY MASS INDEX: 30.86 KG/M2 | WEIGHT: 215.6 LBS

## 2025-01-31 DIAGNOSIS — G44.89 OTHER HEADACHE SYNDROME: Primary | ICD-10-CM

## 2025-01-31 DIAGNOSIS — Z00.00 WELL ADULT HEALTH CHECK: ICD-10-CM

## 2025-01-31 PROBLEM — J02.8 ACUTE PHARYNGITIS DUE TO OTHER SPECIFIED ORGANISMS: Status: RESOLVED | Noted: 2025-01-21 | Resolved: 2025-01-31

## 2025-01-31 PROCEDURE — 99213 OFFICE O/P EST LOW 20 MIN: CPT | Performed by: INTERNAL MEDICINE

## 2025-01-31 PROCEDURE — 1159F MED LIST DOCD IN RCRD: CPT | Performed by: INTERNAL MEDICINE

## 2025-01-31 PROCEDURE — 1126F AMNT PAIN NOTED NONE PRSNT: CPT | Performed by: INTERNAL MEDICINE

## 2025-01-31 PROCEDURE — 1160F RVW MEDS BY RX/DR IN RCRD: CPT | Performed by: INTERNAL MEDICINE

## 2025-01-31 NOTE — PROGRESS NOTES
"Chief Complaint  Follow-up (ED f/u ), Eye Pain (Decreased vision ), and Headache (Left sided head pain went to the ER for an MRI and pt states it was normal )    Subjective      Chris Gomez presents to [facility]    History of present illness:  Patient 29-year-old male with celiac disease, reflux, microscopic hematuria, obesity, among others, seen 9/23 for New Patient appointment, who is coming in now 9/24 for 6-month follow-up.  We will review his med list, go over any recent labs and management of pain, address his care gaps, and make further recommendations at that time. (Will just get labs at [facility]).---> patient is here later 1/25 for issues as per chief complaint above.    Review of Systems   Constitutional:  Negative for appetite change, fatigue and fever.   HENT:  Negative for congestion and ear pain.    Eyes:  Negative for blurred vision.   Respiratory:  Negative for cough, chest tightness, shortness of breath and wheezing.    Cardiovascular:  Negative for chest pain, palpitations and leg swelling.   Gastrointestinal:  Negative for abdominal pain.   Genitourinary:  Negative for difficulty urinating, dysuria and hematuria.   Musculoskeletal:  Negative for arthralgias and gait problem.   Skin:  Negative for skin lesions.   Neurological:  Negative for syncope, memory problem and confusion.   Psychiatric/Behavioral:  Negative for self-injury and depressed mood.        Objective   Vital Signs:   /70   Pulse 88   Temp 97.1 °F (36.2 °C)   Ht 177.8 cm (70\")   Wt 97.8 kg (215 lb 9.6 oz)   SpO2 96%   BMI 30.94 kg/m²           Physical Exam  Vitals and nursing note reviewed.   Constitutional:       General: He is not in acute distress.     Appearance: Normal appearance. He is not toxic-appearing.   HENT:      Head: Atraumatic.      Right Ear: External ear normal.      Left Ear: External ear normal.      Nose: Nose normal.      Mouth/Throat:      Mouth: Mucous " membranes are moist.   Eyes:      General:         Right eye: No discharge.         Left eye: No discharge.      Extraocular Movements: Extraocular movements intact.      Pupils: Pupils are equal, round, and reactive to light.   Cardiovascular:      Rate and Rhythm: Normal rate and regular rhythm.      Pulses: Normal pulses.      Heart sounds: Normal heart sounds. No murmur heard.     No gallop.   Pulmonary:      Effort: Pulmonary effort is normal. No respiratory distress.      Breath sounds: No wheezing, rhonchi or rales.   Abdominal:      General: There is no distension.      Palpations: Abdomen is soft. There is no mass.      Tenderness: There is no abdominal tenderness. There is no guarding.   Musculoskeletal:         General: No swelling or tenderness.      Cervical back: No tenderness.      Right lower leg: No edema.      Left lower leg: No edema.   Skin:     General: Skin is warm and dry.      Findings: No rash.   Neurological:      General: No focal deficit present.      Mental Status: He is alert and oriented to person, place, and time. Mental status is at baseline.      Motor: No weakness.      Gait: Gait normal.   Psychiatric:         Mood and Affect: Mood normal.         Thought Content: Thought content normal.          Result Review   The following data was reviewed by: Bentley Be MD on 09/15/2023:             Assessment and Plan   Diagnoses and all orders for this visit:    1. Other headache syndrome (Primary)  Overview:  Head CT 12/24:  COMPARISON:  12/7/2020     FINDINGS:  Ventricular size and configuration are normal. No acute infarct or hemorrhage is identified. There are no masses. There is no skull fracture. Mild right cerebellar tonsillar ectopia is unchanged from MRI brain 12/10/2020.     IMPRESSION:  No acute findings and no significant interval change.    Assessment & Plan:  This is indication for his second 1/25 urgent visit.  He had left-sided headache and eye pain with decreased vision  "and sought evaluation in the ER last month and I reviewed their notes as below:    The patient is a 29 y.o. year old male who presents to the emergency department for evaluation of left-sided headache that he states started today about 3:00.  He states that it was on and off for about 3 hours and describes it as a pulsating pain.  He states that he heard a \"crack\" in his brain at the onset.  He states that his left eye feels swollen.  He denies any significant vision changes to the left eye.  He reports that he has had no recent falls or trauma.  He denies any recent fevers.  He states that he has been drinking root beer and is not sure if he is not having some sort of side effects from the root beer but states that when he put in his dental retainer that his symptoms improved.  He denies any neck pain.  He reports no tenderness with palpation.  He has no nuchal rigidity.  He is alert and oriented with a grossly intact neuroexam.  He states he has not noticed any one-sided weakness or difficulties with speech today.     I d/w pt that he needs to treat the next h/a with typical OTC meds, especially tylenol.  Agree that he should avoid soda/caffeine since he had increase in headache after utilizing this or felt like another was getting ready to start.    Patient presently asymptomatic, but he is having some issues with the vision in his left eye.  Not presently having any pain in the eye.  It is normal on exam, extraocular muscles are intact.  He does notice some decreased visual acuity in the left eye.    Discussed with patient he needs to be seen by optometrist/ophthalmologist, probably needs refraction, certainly needs to be tested for glaucoma etc.    Orders:  -     Sedimentation Rate; Future    2. Well adult health check  -     Comprehensive Metabolic Panel; Future  -     Lipid Panel; Future  -     Hemoglobin A1c; Future  -     CBC & Differential; Future  -     Vitamin D,25-Hydroxy; Future  -     TSH+Free T4; " Future            BMI is >= 30 and <35. (Class 1 Obesity). The following options were offered after discussion;: exercise counseling/recommendations and nutrition counseling/recommendations      Follow Up   Return for Next scheduled follow up.  Patient was given instructions and counseling regarding his condition or for health maintenance advice. Please see specific information pulled into the AVS if appropriate.     Total Time Spent:   minutes     This time includes time spent by me in the following activities: preparing for the visit, reviewing extensive past medical history and tests, performing a medically appropriate examination and/or evaluation, counseling and educating the patient and/or caregivers, ordering medications, tests, or procedures, referring and/or communicating with other health care professionals and documenting information in the medical record all on this date of service.

## 2025-01-31 NOTE — ASSESSMENT & PLAN NOTE
"This is indication for his second 1/25 urgent visit.  He had left-sided headache and eye pain with decreased vision and sought evaluation in the ER last month and I reviewed their notes as below:    The patient is a 29 y.o. year old male who presents to the emergency department for evaluation of left-sided headache that he states started today about 3:00.  He states that it was on and off for about 3 hours and describes it as a pulsating pain.  He states that he heard a \"crack\" in his brain at the onset.  He states that his left eye feels swollen.  He denies any significant vision changes to the left eye.  He reports that he has had no recent falls or trauma.  He denies any recent fevers.  He states that he has been drinking root beer and is not sure if he is not having some sort of side effects from the root beer but states that when he put in his dental retainer that his symptoms improved.  He denies any neck pain.  He reports no tenderness with palpation.  He has no nuchal rigidity.  He is alert and oriented with a grossly intact neuroexam.  He states he has not noticed any one-sided weakness or difficulties with speech today.     I d/w pt that he needs to treat the next h/a with typical OTC meds, especially tylenol.  Agree that he should avoid soda/caffeine since he had increase in headache after utilizing this or felt like another was getting ready to start.    Patient presently asymptomatic, but he is having some issues with the vision in his left eye.  Not presently having any pain in the eye.  It is normal on exam, extraocular muscles are intact.  He does notice some decreased visual acuity in the left eye.    Discussed with patient he needs to be seen by optometrist/ophthalmologist, probably needs refraction, certainly needs to be tested for glaucoma etc.  "

## 2025-03-19 ENCOUNTER — LAB (OUTPATIENT)
Dept: LAB | Facility: HOSPITAL | Age: 30
End: 2025-03-19
Payer: MEDICAID

## 2025-03-19 DIAGNOSIS — Z00.00 WELL ADULT HEALTH CHECK: ICD-10-CM

## 2025-03-19 DIAGNOSIS — G44.89 OTHER HEADACHE SYNDROME: ICD-10-CM

## 2025-03-19 LAB
25(OH)D3 SERPL-MCNC: 15 NG/ML (ref 30–100)
ALBUMIN SERPL-MCNC: 4.3 G/DL (ref 3.5–5.2)
ALBUMIN/GLOB SERPL: 1.3 G/DL
ALP SERPL-CCNC: 97 U/L (ref 39–117)
ALT SERPL W P-5'-P-CCNC: 22 U/L (ref 1–41)
ANION GAP SERPL CALCULATED.3IONS-SCNC: 6.5 MMOL/L (ref 5–15)
AST SERPL-CCNC: 25 U/L (ref 1–40)
BASOPHILS # BLD AUTO: 0.05 10*3/MM3 (ref 0–0.2)
BASOPHILS NFR BLD AUTO: 0.9 % (ref 0–1.5)
BILIRUB SERPL-MCNC: 0.4 MG/DL (ref 0–1.2)
BUN SERPL-MCNC: 8 MG/DL (ref 6–20)
BUN/CREAT SERPL: 7.5 (ref 7–25)
CALCIUM SPEC-SCNC: 9.2 MG/DL (ref 8.6–10.5)
CHLORIDE SERPL-SCNC: 105 MMOL/L (ref 98–107)
CHOLEST SERPL-MCNC: 191 MG/DL (ref 0–200)
CO2 SERPL-SCNC: 24.5 MMOL/L (ref 22–29)
CREAT SERPL-MCNC: 1.07 MG/DL (ref 0.76–1.27)
DEPRECATED RDW RBC AUTO: 39.1 FL (ref 37–54)
EGFRCR SERPLBLD CKD-EPI 2021: 95.7 ML/MIN/1.73
EOSINOPHIL # BLD AUTO: 0.26 10*3/MM3 (ref 0–0.4)
EOSINOPHIL NFR BLD AUTO: 4.7 % (ref 0.3–6.2)
ERYTHROCYTE [DISTWIDTH] IN BLOOD BY AUTOMATED COUNT: 12.5 % (ref 12.3–15.4)
ERYTHROCYTE [SEDIMENTATION RATE] IN BLOOD: 7 MM/HR (ref 0–15)
GLOBULIN UR ELPH-MCNC: 3.3 GM/DL
GLUCOSE SERPL-MCNC: 97 MG/DL (ref 65–99)
HCT VFR BLD AUTO: 43.7 % (ref 37.5–51)
HDLC SERPL-MCNC: 34 MG/DL (ref 40–60)
HGB BLD-MCNC: 14.6 G/DL (ref 13–17.7)
IMM GRANULOCYTES # BLD AUTO: 0.02 10*3/MM3 (ref 0–0.05)
IMM GRANULOCYTES NFR BLD AUTO: 0.4 % (ref 0–0.5)
LDLC SERPL CALC-MCNC: 129 MG/DL (ref 0–100)
LDLC/HDLC SERPL: 3.69 {RATIO}
LYMPHOCYTES # BLD AUTO: 1.83 10*3/MM3 (ref 0.7–3.1)
LYMPHOCYTES NFR BLD AUTO: 32.8 % (ref 19.6–45.3)
MCH RBC QN AUTO: 29.1 PG (ref 26.6–33)
MCHC RBC AUTO-ENTMCNC: 33.4 G/DL (ref 31.5–35.7)
MCV RBC AUTO: 87.1 FL (ref 79–97)
MONOCYTES # BLD AUTO: 0.44 10*3/MM3 (ref 0.1–0.9)
MONOCYTES NFR BLD AUTO: 7.9 % (ref 5–12)
NEUTROPHILS NFR BLD AUTO: 2.98 10*3/MM3 (ref 1.7–7)
NEUTROPHILS NFR BLD AUTO: 53.3 % (ref 42.7–76)
NRBC BLD AUTO-RTO: 0 /100 WBC (ref 0–0.2)
PLATELET # BLD AUTO: 200 10*3/MM3 (ref 140–450)
PMV BLD AUTO: 11.4 FL (ref 6–12)
POTASSIUM SERPL-SCNC: 4.3 MMOL/L (ref 3.5–5.2)
PROT SERPL-MCNC: 7.6 G/DL (ref 6–8.5)
RBC # BLD AUTO: 5.02 10*6/MM3 (ref 4.14–5.8)
SODIUM SERPL-SCNC: 136 MMOL/L (ref 136–145)
T4 FREE SERPL-MCNC: 1.26 NG/DL (ref 0.92–1.68)
TRIGL SERPL-MCNC: 157 MG/DL (ref 0–150)
TSH SERPL DL<=0.05 MIU/L-ACNC: 1.76 UIU/ML (ref 0.27–4.2)
VLDLC SERPL-MCNC: 28 MG/DL (ref 5–40)
WBC NRBC COR # BLD AUTO: 5.58 10*3/MM3 (ref 3.4–10.8)

## 2025-03-19 PROCEDURE — 83036 HEMOGLOBIN GLYCOSYLATED A1C: CPT

## 2025-03-19 PROCEDURE — 36415 COLL VENOUS BLD VENIPUNCTURE: CPT

## 2025-03-19 PROCEDURE — 80061 LIPID PANEL: CPT

## 2025-03-19 PROCEDURE — 85025 COMPLETE CBC W/AUTO DIFF WBC: CPT

## 2025-03-19 PROCEDURE — 80053 COMPREHEN METABOLIC PANEL: CPT

## 2025-03-19 PROCEDURE — 85652 RBC SED RATE AUTOMATED: CPT

## 2025-03-19 PROCEDURE — 82306 VITAMIN D 25 HYDROXY: CPT

## 2025-03-19 PROCEDURE — 84443 ASSAY THYROID STIM HORMONE: CPT

## 2025-03-19 PROCEDURE — 84439 ASSAY OF FREE THYROXINE: CPT

## 2025-03-20 ENCOUNTER — OFFICE VISIT (OUTPATIENT)
Age: 30
End: 2025-03-20
Payer: MEDICAID

## 2025-03-20 VITALS
WEIGHT: 215 LBS | SYSTOLIC BLOOD PRESSURE: 112 MMHG | HEIGHT: 70 IN | DIASTOLIC BLOOD PRESSURE: 74 MMHG | OXYGEN SATURATION: 97 % | HEART RATE: 75 BPM | BODY MASS INDEX: 30.78 KG/M2

## 2025-03-20 DIAGNOSIS — E55.9 VITAMIN D DEFICIENCY: ICD-10-CM

## 2025-03-20 DIAGNOSIS — E03.8 SUBCLINICAL HYPOTHYROIDISM: Primary | ICD-10-CM

## 2025-03-20 DIAGNOSIS — E78.2 MIXED HYPERLIPIDEMIA: ICD-10-CM

## 2025-03-20 DIAGNOSIS — Z00.00 WELL ADULT HEALTH CHECK: ICD-10-CM

## 2025-03-20 LAB — HBA1C MFR BLD: 5.3 % (ref 4.8–5.6)

## 2025-03-20 NOTE — ASSESSMENT & PLAN NOTE
TSH is down from 4 to 2 and now to 1.7 as of his 3/25 OV.  Certainly no issues here, will just get back to monitoring his TSH annually.

## 2025-03-20 NOTE — ASSESSMENT & PLAN NOTE
LDL is very reasonable at 129 as of his 3/25 OV, he is still low risk, sugars are normal and blood pressure is excellent.  Shortness of treatment indicated we will just check this annually now.

## 2025-03-20 NOTE — PROGRESS NOTES
"Chief Complaint  Follow-up (Labs completed.)    Subjective      Chris Gomez presents to [facility]    History of present illness:  Patient 30-year-old male with celiac disease, reflux, microscopic hematuria, obesity, among others, seen 9/23 for New Patient appointment, who is coming in now 3/25 for routine 6-month follow-up.  We will review his med list, go over any recent labs and management of pain, address his care gaps, and make further recommendations at that time.     Review of Systems   Constitutional:  Negative for appetite change, fatigue and fever.   HENT:  Negative for congestion and ear pain.    Eyes:  Negative for blurred vision.   Respiratory:  Negative for cough, chest tightness, shortness of breath and wheezing.    Cardiovascular:  Negative for chest pain, palpitations and leg swelling.   Gastrointestinal:  Negative for abdominal pain.   Genitourinary:  Negative for difficulty urinating, dysuria and hematuria.   Musculoskeletal:  Negative for arthralgias and gait problem.   Skin:  Negative for skin lesions.   Neurological:  Negative for syncope, memory problem and confusion.   Psychiatric/Behavioral:  Negative for self-injury and depressed mood.        Objective   Vital Signs:   /74   Pulse 75   Ht 177.8 cm (70\")   Wt 97.5 kg (215 lb)   SpO2 97%   BMI 30.85 kg/m²           Physical Exam  Vitals and nursing note reviewed.   Constitutional:       General: He is not in acute distress.     Appearance: Normal appearance. He is not toxic-appearing.   HENT:      Head: Atraumatic.      Right Ear: External ear normal.      Left Ear: External ear normal.      Nose: Nose normal.      Mouth/Throat:      Mouth: Mucous membranes are moist.   Eyes:      General:         Right eye: No discharge.         Left eye: No discharge.      Extraocular Movements: Extraocular movements intact.      Pupils: Pupils are equal, round, and reactive to light.   Cardiovascular: "      Rate and Rhythm: Normal rate and regular rhythm.      Pulses: Normal pulses.      Heart sounds: Normal heart sounds. No murmur heard.     No gallop.   Pulmonary:      Effort: Pulmonary effort is normal. No respiratory distress.      Breath sounds: No wheezing, rhonchi or rales.   Abdominal:      General: There is no distension.      Palpations: Abdomen is soft. There is no mass.      Tenderness: There is no abdominal tenderness. There is no guarding.   Musculoskeletal:         General: No swelling or tenderness.      Cervical back: No tenderness.      Right lower leg: No edema.      Left lower leg: No edema.   Skin:     General: Skin is warm and dry.      Findings: No rash.   Neurological:      General: No focal deficit present.      Mental Status: He is alert and oriented to person, place, and time. Mental status is at baseline.      Motor: No weakness.      Gait: Gait normal.   Psychiatric:         Mood and Affect: Mood normal.         Thought Content: Thought content normal.          Result Review   The following data was reviewed by: Bentley Be MD on 09/15/2023:             Assessment and Plan   Diagnoses and all orders for this visit:    1. Subclinical hypothyroidism (Primary)  Assessment & Plan:  TSH is down from 4 to 2 and now to 1.7 as of his 3/25 OV.  Certainly no issues here, will just get back to monitoring his TSH annually.    Orders:  -     TSH+Free T4; Future    2. Mixed hyperlipidemia  Assessment & Plan:   LDL is very reasonable at 129 as of his 3/25 OV, he is still low risk, sugars are normal and blood pressure is excellent.  Shortness of treatment indicated we will just check this annually now.    Orders:  -     Lipid Panel; Future    3. Well adult health check  -     CBC & Differential; Future  -     Comprehensive Metabolic Panel; Future  -     Urinalysis With Culture If Indicated -; Future  -     Hemoglobin A1c; Future    4. Vitamin D deficiency  Assessment & Plan:  Vitamin D remains low  at 15 as of his 3/25 OV.  Previously recommended over-the-counter supplementation, so I just tried to put in for prescription dose going forward, it is not covered on his insurance, so recommend the patient take over-the-counter again and be more diligent with it please.    Orders:  -     Cancel: Vitamin D,25-Hydroxy; Future  -     Vitamin D,25-Hydroxy; Future  -     Vitamin D,25-Hydroxy; Future              BMI is >= 30 and <35. (Class 1 Obesity). The following options were offered after discussion;: exercise counseling/recommendations and nutrition counseling/recommendations      Follow Up   Return in about 1 year (around 3/20/2026).  Patient was given instructions and counseling regarding his condition or for health maintenance advice. Please see specific information pulled into the AVS if appropriate.     Total Time Spent:   minutes     This time includes time spent by me in the following activities: preparing for the visit, reviewing extensive past medical history and tests, performing a medically appropriate examination and/or evaluation, counseling and educating the patient and/or caregivers, ordering medications, tests, or procedures, referring and/or communicating with other health care professionals and documenting information in the medical record all on this date of service.

## 2025-03-20 NOTE — ASSESSMENT & PLAN NOTE
Vitamin D remains low at 15 as of his 3/25 OV.  Previously recommended over-the-counter supplementation, so I just tried to put in for prescription dose going forward, it is not covered on his insurance, so recommend the patient take over-the-counter again and be more diligent with it please.

## 2025-03-20 NOTE — PATIENT INSTRUCTIONS
1.  You need to get Vitamin D3 2000 international units over-the-counter and take it every day.    2.  You have a nonfasting vitamin D level to have drawn in about 3 months, please call for the results.    3.  Otherwise you have routine fasting labs to do next year just prior to your annual appointment.    4.  If allergy-like symptoms persist, Claritin/loratadine 10 mg once a day is the mildest medication to use over-the-counter for this.

## 2025-06-20 ENCOUNTER — LAB (OUTPATIENT)
Dept: LAB | Facility: HOSPITAL | Age: 30
End: 2025-06-20
Payer: MEDICAID

## 2025-06-20 DIAGNOSIS — E55.9 VITAMIN D DEFICIENCY: ICD-10-CM

## 2025-06-20 LAB — 25(OH)D3 SERPL-MCNC: 20.3 NG/ML (ref 30–100)

## 2025-06-20 PROCEDURE — 82306 VITAMIN D 25 HYDROXY: CPT

## 2025-06-20 PROCEDURE — 36415 COLL VENOUS BLD VENIPUNCTURE: CPT

## 2025-06-23 ENCOUNTER — TELEPHONE (OUTPATIENT)
Age: 30
End: 2025-06-23
Payer: MEDICAID

## 2025-06-23 DIAGNOSIS — F41.1 GENERALIZED ANXIETY DISORDER: Primary | ICD-10-CM

## 2025-06-23 NOTE — TELEPHONE ENCOUNTER
Caller: ZHAO LARSEN    Relationship to patient: Mother    Best call back number: [phone]    Patient is needing: Patient's mother called in and is requesting a referral to   Dr. Aguirre in psychiatry for patient. Patient's mother said it is okay to leave message on phone.

## (undated) DEVICE — SOL IRRG H2O PL/BG 1000ML STRL

## (undated) DEVICE — EGD OR ERCP KIT: Brand: MEDLINE INDUSTRIES, INC.

## (undated) DEVICE — Device: Brand: DEFENDO AIR/WATER/SUCTION AND BIOPSY VALVE

## (undated) DEVICE — COLON KIT: Brand: MEDLINE INDUSTRIES, INC.

## (undated) DEVICE — SINGLE-USE BIOPSY FORCEPS: Brand: RADIAL JAW 4